# Patient Record
Sex: FEMALE | Race: WHITE | NOT HISPANIC OR LATINO | URBAN - METROPOLITAN AREA
[De-identification: names, ages, dates, MRNs, and addresses within clinical notes are randomized per-mention and may not be internally consistent; named-entity substitution may affect disease eponyms.]

---

## 2021-02-19 ENCOUNTER — INPATIENT (INPATIENT)
Facility: HOSPITAL | Age: 71
LOS: 0 days | Discharge: HOME | End: 2021-02-20
Attending: INTERNAL MEDICINE | Admitting: INTERNAL MEDICINE
Payer: MEDICARE

## 2021-02-19 VITALS
SYSTOLIC BLOOD PRESSURE: 171 MMHG | HEART RATE: 118 BPM | DIASTOLIC BLOOD PRESSURE: 97 MMHG | TEMPERATURE: 98 F | RESPIRATION RATE: 18 BRPM | OXYGEN SATURATION: 97 %

## 2021-02-19 DIAGNOSIS — I26.99 OTHER PULMONARY EMBOLISM WITHOUT ACUTE COR PULMONALE: ICD-10-CM

## 2021-02-19 LAB
ALBUMIN SERPL ELPH-MCNC: 4 G/DL — SIGNIFICANT CHANGE UP (ref 3.5–5.2)
ALP SERPL-CCNC: 89 U/L — SIGNIFICANT CHANGE UP (ref 30–115)
ALT FLD-CCNC: 21 U/L — SIGNIFICANT CHANGE UP (ref 0–41)
ANION GAP SERPL CALC-SCNC: 13 MMOL/L — SIGNIFICANT CHANGE UP (ref 7–14)
APTT BLD: 28.2 SEC — SIGNIFICANT CHANGE UP (ref 27–39.2)
AST SERPL-CCNC: 19 U/L — SIGNIFICANT CHANGE UP (ref 0–41)
BASOPHILS # BLD AUTO: 0.04 K/UL — SIGNIFICANT CHANGE UP (ref 0–0.2)
BASOPHILS NFR BLD AUTO: 0.5 % — SIGNIFICANT CHANGE UP (ref 0–1)
BILIRUB SERPL-MCNC: 0.5 MG/DL — SIGNIFICANT CHANGE UP (ref 0.2–1.2)
BUN SERPL-MCNC: 14 MG/DL — SIGNIFICANT CHANGE UP (ref 10–20)
CALCIUM SERPL-MCNC: 9 MG/DL — SIGNIFICANT CHANGE UP (ref 8.5–10.1)
CHLORIDE SERPL-SCNC: 103 MMOL/L — SIGNIFICANT CHANGE UP (ref 98–110)
CO2 SERPL-SCNC: 26 MMOL/L — SIGNIFICANT CHANGE UP (ref 17–32)
CREAT SERPL-MCNC: 0.7 MG/DL — SIGNIFICANT CHANGE UP (ref 0.7–1.5)
D DIMER BLD IA.RAPID-MCNC: HIGH NG/ML DDU (ref 0–230)
EOSINOPHIL # BLD AUTO: 0.09 K/UL — SIGNIFICANT CHANGE UP (ref 0–0.7)
EOSINOPHIL NFR BLD AUTO: 1.2 % — SIGNIFICANT CHANGE UP (ref 0–8)
GLUCOSE SERPL-MCNC: 169 MG/DL — HIGH (ref 70–99)
HCT VFR BLD CALC: 42.4 % — SIGNIFICANT CHANGE UP (ref 37–47)
HGB BLD-MCNC: 14.4 G/DL — SIGNIFICANT CHANGE UP (ref 12–16)
IMM GRANULOCYTES NFR BLD AUTO: 0.7 % — HIGH (ref 0.1–0.3)
INR BLD: 1.02 RATIO — SIGNIFICANT CHANGE UP (ref 0.65–1.3)
LYMPHOCYTES # BLD AUTO: 2.11 K/UL — SIGNIFICANT CHANGE UP (ref 1.2–3.4)
LYMPHOCYTES # BLD AUTO: 28.5 % — SIGNIFICANT CHANGE UP (ref 20.5–51.1)
MCHC RBC-ENTMCNC: 28.3 PG — SIGNIFICANT CHANGE UP (ref 27–31)
MCHC RBC-ENTMCNC: 34 G/DL — SIGNIFICANT CHANGE UP (ref 32–37)
MCV RBC AUTO: 83.3 FL — SIGNIFICANT CHANGE UP (ref 81–99)
MONOCYTES # BLD AUTO: 0.58 K/UL — SIGNIFICANT CHANGE UP (ref 0.1–0.6)
MONOCYTES NFR BLD AUTO: 7.8 % — SIGNIFICANT CHANGE UP (ref 1.7–9.3)
NEUTROPHILS # BLD AUTO: 4.54 K/UL — SIGNIFICANT CHANGE UP (ref 1.4–6.5)
NEUTROPHILS NFR BLD AUTO: 61.3 % — SIGNIFICANT CHANGE UP (ref 42.2–75.2)
NRBC # BLD: 0 /100 WBCS — SIGNIFICANT CHANGE UP (ref 0–0)
NT-PROBNP SERPL-SCNC: 153 PG/ML — SIGNIFICANT CHANGE UP (ref 0–300)
PLATELET # BLD AUTO: 309 K/UL — SIGNIFICANT CHANGE UP (ref 130–400)
POTASSIUM SERPL-MCNC: 3.7 MMOL/L — SIGNIFICANT CHANGE UP (ref 3.5–5)
POTASSIUM SERPL-SCNC: 3.7 MMOL/L — SIGNIFICANT CHANGE UP (ref 3.5–5)
PROT SERPL-MCNC: 7.4 G/DL — SIGNIFICANT CHANGE UP (ref 6–8)
PROTHROM AB SERPL-ACNC: 11.7 SEC — SIGNIFICANT CHANGE UP (ref 9.95–12.87)
RAPID RVP RESULT: SIGNIFICANT CHANGE UP
RBC # BLD: 5.09 M/UL — SIGNIFICANT CHANGE UP (ref 4.2–5.4)
RBC # FLD: 13.2 % — SIGNIFICANT CHANGE UP (ref 11.5–14.5)
SARS-COV-2 RNA SPEC QL NAA+PROBE: SIGNIFICANT CHANGE UP
SODIUM SERPL-SCNC: 142 MMOL/L — SIGNIFICANT CHANGE UP (ref 135–146)
TROPONIN T SERPL-MCNC: <0.01 NG/ML — SIGNIFICANT CHANGE UP
WBC # BLD: 7.41 K/UL — SIGNIFICANT CHANGE UP (ref 4.8–10.8)
WBC # FLD AUTO: 7.41 K/UL — SIGNIFICANT CHANGE UP (ref 4.8–10.8)

## 2021-02-19 PROCEDURE — 93010 ELECTROCARDIOGRAM REPORT: CPT

## 2021-02-19 PROCEDURE — 99285 EMERGENCY DEPT VISIT HI MDM: CPT

## 2021-02-19 PROCEDURE — 71275 CT ANGIOGRAPHY CHEST: CPT | Mod: 26

## 2021-02-19 PROCEDURE — 93970 EXTREMITY STUDY: CPT | Mod: 26

## 2021-02-19 RX ORDER — ACETAMINOPHEN 500 MG
650 TABLET ORAL EVERY 4 HOURS
Refills: 0 | Status: DISCONTINUED | OUTPATIENT
Start: 2021-02-19 | End: 2021-02-20

## 2021-02-19 RX ORDER — IBUPROFEN 200 MG
600 TABLET ORAL EVERY 6 HOURS
Refills: 0 | Status: DISCONTINUED | OUTPATIENT
Start: 2021-02-19 | End: 2021-02-20

## 2021-02-19 RX ORDER — ONDANSETRON 8 MG/1
4 TABLET, FILM COATED ORAL EVERY 6 HOURS
Refills: 0 | Status: DISCONTINUED | OUTPATIENT
Start: 2021-02-19 | End: 2021-02-20

## 2021-02-19 RX ORDER — PANTOPRAZOLE SODIUM 20 MG/1
40 TABLET, DELAYED RELEASE ORAL
Refills: 0 | Status: DISCONTINUED | OUTPATIENT
Start: 2021-02-19 | End: 2021-02-20

## 2021-02-19 RX ORDER — ACETAMINOPHEN 500 MG
650 TABLET ORAL ONCE
Refills: 0 | Status: COMPLETED | OUTPATIENT
Start: 2021-02-19 | End: 2021-02-19

## 2021-02-19 RX ORDER — ENOXAPARIN SODIUM 100 MG/ML
70 INJECTION SUBCUTANEOUS
Refills: 0 | Status: DISCONTINUED | OUTPATIENT
Start: 2021-02-19 | End: 2021-02-20

## 2021-02-19 RX ORDER — ENOXAPARIN SODIUM 100 MG/ML
60 INJECTION SUBCUTANEOUS ONCE
Refills: 0 | Status: COMPLETED | OUTPATIENT
Start: 2021-02-19 | End: 2021-02-19

## 2021-02-19 RX ORDER — MORPHINE SULFATE 50 MG/1
2 CAPSULE, EXTENDED RELEASE ORAL EVERY 4 HOURS
Refills: 0 | Status: DISCONTINUED | OUTPATIENT
Start: 2021-02-19 | End: 2021-02-20

## 2021-02-19 RX ADMIN — Medication 650 MILLIGRAM(S): at 11:38

## 2021-02-19 RX ADMIN — ENOXAPARIN SODIUM 60 MILLIGRAM(S): 100 INJECTION SUBCUTANEOUS at 15:55

## 2021-02-19 RX ADMIN — Medication 650 MILLIGRAM(S): at 21:45

## 2021-02-19 NOTE — ED PROVIDER NOTE - PHYSICAL EXAMINATION
PHYSICAL EXAM: I have reviewed current vital signs.  GENERAL: NAD, well-nourished; well-developed.  HEAD:  Normocephalic, atraumatic.  EYES: Conjunctiva and sclera clear.  ENT: MMM.  NECK: Supple, FROM.  CHEST/LUNG: Clear to auscultation bilaterally; no wheezes, rales, or rhonchi.  HEART: Regular rate and rhythm, normal S1 and S2; no murmurs, rubs, or gallops.  ABDOMEN: Soft, nontender, nondistended.  EXTREMITIES:  2+ peripheral pulses; BLE- mild calf TTP, no overlying skin wounds/erythema.  NEUROLOGY: A&O x 3. Motor 5/5. No focal neurological deficits.   SKIN: Warm and dry.

## 2021-02-19 NOTE — H&P ADULT - NSICDXPASTMEDICALHX_GEN_ALL_CORE_FT
PAST MEDICAL HISTORY:  Infection due to severe acute respiratory syndrome coronavirus 2 (SARS-CoV-2) in 01/2021

## 2021-02-19 NOTE — H&P ADULT - NSICDXFAMILYHX_GEN_ALL_CORE_FT
FAMILY HISTORY:  Family history non-contributory, No FHx of thrombophilias, bleeding disorders, malignancies

## 2021-02-19 NOTE — H&P ADULT - HISTORY OF PRESENT ILLNESS
70 year old female patient, known previously healthy, presenting for bilateral calf pain and dyspnea.     Current history goes back to 5 weeks prior to presentation when the patient began experiencing fever and cough with mild dyspnea on exertion.   Patient was diagnosed with COVID-19 LRTI and self quarantined at home.   Patient never required supplemental oxygen.   One week prior to presentation, patient began experiencing bilateral calf pain. Pain persisted throughout the week for which she seeked medical attention. No significant change in dyspnea on exertion, no chest pain, no fever, no chills, no diaphoresis.

## 2021-02-19 NOTE — H&P ADULT - ATTENDING COMMENTS
I saw and evaluated the patient. I have reviewed and agree with the findings and plan of care as documented above in the resident’s note (unless indicated differently below). Any necessary changes were made in the body of the text.    CC: b/l calf pain and dyspnea    HPI:  71 yo F pt who had SARS-CoV-2 infection (~ 5 weeks ago) p/w b/l calf pain and mild dyspnea on exertion. Onset: 1 week ago. Course: persistent symptoms. Intensity: mild to moderate. Pain: along b/l calves (more prominent on rt), aching, mild to mod, non-radiating. Precipitating factors: infection; denies prolonged immobility or travel. Alleviating factors: none. Aggravating factors: exertion. States that nothing like this has happened to her before.    ROS:  Constitutional: no fevers; no chills  Eyes: no conjunctivitis; no itching  ENT: no dysphagia; no odynophagia  CVS: no PND; no orthopnea; no chest pain  Resp: +mild STREET (constant without worsening); no coughing  GI: no nausea; no vomiting; no diarrhea; no abd pain  : no dysuria; no hematuria  MSK: +b/l LE calf area pain  Skin: no rashes; no ulcers  Neuro: no focal weakness; no headache  All other systems reviewed and are negative    PMHx, home medications, SurHx, FHx and Social history as above in the corresponding sections of the note - reviewed and edited where appropriate    Exam:  Vitals: BP = 144/78; P = 79; T = 98.3; RR = 18; SpO2 92-95 on room air  General: appears stated age; cooperative  Eyes: anicteric sclera; moist conjunctiva; PERRL; EOMI  HENT: NC/AT; clear oropharynx w/ moist mucous membranes; nL hard/soft palate  Neck: supple w/ FROM; trachea midline  Lungs: no tachypnea, accessory muscle usage, wheezing, rhonci; +rales mild  CVS: RRR; S1 and S2 w/o MRGs  Abd: BS+; soft; non-tender to palpation x 4; no masses or HSM  Ext: non-edematous; pulses palpable distally; +b/l LE calf tenderness  Skin: normal temp, turgor and texture; no rashes, ulcers or nodules  Neuro: CN II-XII intact; able to move all extremities  Psych: appropriate affect; alert and oriented to person, place, time and situation    Labs reviewed and are significant for , trig 193    Imaging reviewed:  --- Extensive b/l pulmonary arterial emboli throughout all b/l lobar branches and peripherally  --- Partial extension of thrombus from distal right main pulmonary artery  --- No rt heart strain  --- Diffuse b/l lung groundglass opacities  --- Acute DVT - rt popliteal, rt posterior tibial and rt peroneal  --- Acute DVT - left posterior tibial and left peroneal    EKG reviewed: NSR    Assessment:  (1) Hemodynamically stable, acute lobar pulm emboli w/ mild dyspnea  (2) Acute DVT of the rt popliteal, rt/lt posterior tibial, rt/lt peroneal  (3) Recent hx of SARS-CoV-2 PNA  (4) Hyperglycemia possibly stress assoc  (5) Hypertriglyceridemia on blood work    Plan:  Given extensive clot burden, requested a Vascular evaluation (? would the patient benefit from a mechanical thrombectomy or catheter directed intervention). Otherwise, can switch the patient to PO apixaban and can d/c later today with close out-pt follow up. I favor indefinite anticoagulation for this patient as I question the degree to which this thromboembolic event was provoked (patient reports being relatively active even during her home quarantine). The patient should definitely undergo age appropriate out-pt malignancy screening. Medications reviewed and adjusted as appropriate. Supportive care PRN.    Full code

## 2021-02-19 NOTE — ED ADULT TRIAGE NOTE - CHIEF COMPLAINT QUOTE
Pt c/o b/l LE swelling and pain x 1 week. Pt did not get tested for covid but states had symptoms and +contacts. Pt c/o b/l LE swelling and pain x 1 week. Pt did not get tested for covid but states had symptoms and +contacts.  Son - Cristhian # [phone]

## 2021-02-19 NOTE — ED PROVIDER NOTE - CLINICAL SUMMARY MEDICAL DECISION MAKING FREE TEXT BOX
69 y/o F p/w b/l leg pain x 1wk. Reports COVID sx 3 wks ago. No cp, sob, cough. 69 y/o F pmh as noted, p/w b/l atraumatic intermittent no constant leg pain. No weakness or numbness. no cp sob or hx VTE. had COVID URI sx and sick contacts 3wks ago- sx resolved.    Pt is NAD; CTAB; legs nl inspection; + ttp b/l calf; NL pulses motor and sensory    + elevated ddimer, + multiple DVT and PE's on US and CTA chest  No sign R heart strain. Pt remained hemodynamically stable, no acute events or new sx.    results discussed w/ pt and son. Lovenox given.  Pt admitted to medicine for further management.

## 2021-02-19 NOTE — H&P ADULT - ASSESSMENT
70 year old female patient presenting for bilateral DVT and subsequent PE.     # DVT / PE  - DVT:    > right popliteal vein, right posterior tibial vein, and right peroneal vein.    > left posterior tibial vein and left peroneal vein.  - PE:    > Extensive bilateral pulmonary arterial emboli throughout all bilateral lobar branches and peripherally, with partial extension of thrombus from distal right main pulmonary artery.     > No evidence of right heart strain.  - Started on Lovenox 1mg/kg bid, to be transitioned to apixaban    # COVID-19 LRTI  - Diagnosed 5 weeks ago  - Saturation 98% on room air  - CT scan showed Diffuse bilateral lung groundglass opacification, which may be due to incomplete inspiration however correlate for potential superimposed viral pneumonia in the appropriate clinical setting.  - No required intervention for now    # Diet > Regular

## 2021-02-19 NOTE — ED PROVIDER NOTE - PROGRESS NOTE DETAILS
Mihaela- Spoke at length with patient. Patient initially refused covid swab. Informed patient that she has bilateral DVTs and we need to further assess for PEs. She will need AC vs. thrombectomy and AC after. Patient understands severity of condition. She verbalized she does not wish to be in the hospital but understands she will need to be admitted for further monitoring and anticoagulation. Mihaela- Updated patient's son, Vernon, on pt's clinical course and need to stay. VS stable.

## 2021-02-19 NOTE — ED PROVIDER NOTE - ATTENDING CONTRIBUTION TO CARE
69 y/o F pmh as noted, p/w b/l atraumatic intermittent no constant leg pain. No weakness or numbness. no cp sob or hx VTE. had COVID URI sx and sick contacts 3wks ago- sx resolved.    Pt is NAD; CTAB; legs nl inspection; + ttp b/l calf; NL pulses motor and sensory    + elevated ddimer, + multiple DVT and PE's on US and CTA chest  No sign R heart strain. Pt remained hemodynamically stable, no acute events or new sx.    results discussed w/ pt and son. Lovenox given.  Pt admitted to medicine for further management.

## 2021-02-19 NOTE — H&P ADULT - NSHPPHYSICALEXAM_GEN_ALL_CORE
General: A/ox 3, No acute Distress  Neck: Supple, NO JVD  Cardiac: S1 S2 heard regular, No audible murmurs  Pulmonary: Good bilateral breath sounds, Breathing unlabored, No Rhonchi/Rales/Wheezing  Abdomen: Soft, Non -tender, +BS   Extremities: Bilateral calf tenderness, hard, mild edema  Neuro: A/o x 3, No focal deficits  Psch: normal mood , normal affect    T(C): 36.7 (02-19-21 @ 16:02), Max: 36.9 (02-19-21 @ 10:59)  HR: 82 (02-19-21 @ 16:02) (82 - 118)  BP: 160/89 (02-19-21 @ 16:02) (160/89 - 171/97)  RR: 17 (02-19-21 @ 16:02) (17 - 18)  SpO2: 97% (02-19-21 @ 16:02) (97% - 97%)

## 2021-02-19 NOTE — ED PROVIDER NOTE - OBJECTIVE STATEMENT
71yo F with PMH of prior appendectomy presenting to ED with bilateral LE pain x 1 week. Patient states she has had multiple contacts with loved ones with covid at home. Had covid like sxs about 2-3 weeks ago which included n/v/d, NBNB, myalgias, and weakness. Sxs now improved. Denies any f/c, CP, SOB, abd pain, tearing back pain, rashes, injuries/traumas/falls/syncope, or hx of DVT/PE. Nonsmoker.

## 2021-02-19 NOTE — H&P ADULT - NSHPLABSRESULTS_GEN_ALL_CORE
LABS:                        14.4   7.41  )-----------( 309      ( 19 Feb 2021 11:20 )             42.4     02-19    142  |  103  |  14  ----------------------------<  169<H>  3.7   |  26  |  0.7    Ca    9.0      19 Feb 2021 11:20    TPro  7.4  /  Alb  4.0  /  TBili  0.5  /  DBili  x   /  AST  19  /  ALT  21  /  AlkPhos  89  02-19    PT/INR - ( 19 Feb 2021 11:20 )   PT: 11.70 sec;   INR: 1.02 ratio         PTT - ( 19 Feb 2021 11:20 )  PTT:28.2 sec  Aspartate Aminotransferase (AST/SGOT): 19 U/L (02-19-21 @ 11:20)  Alanine Aminotransferase (ALT/SGPT): 21 U/L (02-19-21 @ 11:20)    VA Duplex Lower Ext Vein Scan, Bilat (02.19.21 @ 13:28) >  Acute deep vein thrombus of the right popliteal vein, right posterior tibial vein, and right peroneal vein.  Acute deep vein thrombus of the left posterior tibial vein and left peroneal vein.    CT Angio Chest PE Protocol w/ IV Cont (02.19.21 @ 15:28) >    1. Extensive bilateral pulmonary arterial emboli throughout all bilateral lobar branches and peripherally, with partial extension of thrombus from distal right main pulmonary artery. No evidence of right heart strain.    2. Diffuse bilateral lung groundglass opacification, which may be due to incomplete inspiration however correlate for potential superimposed viral pneumonia in the appropriate clinical setting.

## 2021-02-19 NOTE — ED ADULT NURSE NOTE - CHIEF COMPLAINT QUOTE
Pt c/o b/l LE swelling and pain x 1 week. Pt did not get tested for covid but states had symptoms and +contacts.

## 2021-02-19 NOTE — ED PROVIDER NOTE - NS ED ROS FT
Constitutional:  No fevers or chills.  Eyes:  No visual changes.  ENT:  No sore throat.  Neck:  No neck pain or stiffness.  Cardiac:  No CP.  Resp:  No cough or SOB. No hemoptysis.   GI:  No nausea, vomiting, diarrhea, or abdominal pain.  MSK:  +BLE leg pain.  Neuro:  No headache, dizziness, or weakness.  Skin:  No skin rash.

## 2021-02-19 NOTE — ED ADULT NURSE NOTE - OBJECTIVE STATEMENT
Pt c/o b/l leg swelling and pain x 1 week. Pt states she has been quarantining due to covid symptoms and + contacts. Pt states she did not get tested for covid. Pt asymptomatic at this time

## 2021-02-20 ENCOUNTER — TRANSCRIPTION ENCOUNTER (OUTPATIENT)
Age: 71
End: 2021-02-20

## 2021-02-20 VITALS
OXYGEN SATURATION: 95 % | SYSTOLIC BLOOD PRESSURE: 144 MMHG | DIASTOLIC BLOOD PRESSURE: 78 MMHG | RESPIRATION RATE: 18 BRPM | TEMPERATURE: 98 F | HEART RATE: 79 BPM

## 2021-02-20 DIAGNOSIS — Z90.49 ACQUIRED ABSENCE OF OTHER SPECIFIED PARTS OF DIGESTIVE TRACT: Chronic | ICD-10-CM

## 2021-02-20 LAB
A1C WITH ESTIMATED AVERAGE GLUCOSE RESULT: 6.6 % — HIGH (ref 4–5.6)
ALBUMIN SERPL ELPH-MCNC: 3.8 G/DL — SIGNIFICANT CHANGE UP (ref 3.5–5.2)
ALP SERPL-CCNC: 89 U/L — SIGNIFICANT CHANGE UP (ref 30–115)
ALT FLD-CCNC: 19 U/L — SIGNIFICANT CHANGE UP (ref 0–41)
ANION GAP SERPL CALC-SCNC: 10 MMOL/L — SIGNIFICANT CHANGE UP (ref 7–14)
AST SERPL-CCNC: 18 U/L — SIGNIFICANT CHANGE UP (ref 0–41)
BASOPHILS # BLD AUTO: 0.03 K/UL — SIGNIFICANT CHANGE UP (ref 0–0.2)
BASOPHILS NFR BLD AUTO: 0.5 % — SIGNIFICANT CHANGE UP (ref 0–1)
BILIRUB SERPL-MCNC: 0.5 MG/DL — SIGNIFICANT CHANGE UP (ref 0.2–1.2)
BUN SERPL-MCNC: 13 MG/DL — SIGNIFICANT CHANGE UP (ref 10–20)
CALCIUM SERPL-MCNC: 8.9 MG/DL — SIGNIFICANT CHANGE UP (ref 8.5–10.1)
CHLORIDE SERPL-SCNC: 104 MMOL/L — SIGNIFICANT CHANGE UP (ref 98–110)
CHOLEST SERPL-MCNC: 172 MG/DL — SIGNIFICANT CHANGE UP
CO2 SERPL-SCNC: 28 MMOL/L — SIGNIFICANT CHANGE UP (ref 17–32)
CREAT SERPL-MCNC: 0.7 MG/DL — SIGNIFICANT CHANGE UP (ref 0.7–1.5)
EOSINOPHIL # BLD AUTO: 0.16 K/UL — SIGNIFICANT CHANGE UP (ref 0–0.7)
EOSINOPHIL NFR BLD AUTO: 2.6 % — SIGNIFICANT CHANGE UP (ref 0–8)
ESTIMATED AVERAGE GLUCOSE: 143 MG/DL — HIGH (ref 68–114)
GLUCOSE SERPL-MCNC: 114 MG/DL — HIGH (ref 70–99)
HCT VFR BLD CALC: 43.1 % — SIGNIFICANT CHANGE UP (ref 37–47)
HCV AB S/CO SERPL IA: 0.03 COI — SIGNIFICANT CHANGE UP
HCV AB SERPL-IMP: SIGNIFICANT CHANGE UP
HDLC SERPL-MCNC: 40 MG/DL — LOW
HGB BLD-MCNC: 14.4 G/DL — SIGNIFICANT CHANGE UP (ref 12–16)
IMM GRANULOCYTES NFR BLD AUTO: 0.8 % — HIGH (ref 0.1–0.3)
LIPID PNL WITH DIRECT LDL SERPL: 104 MG/DL — HIGH
LYMPHOCYTES # BLD AUTO: 2.3 K/UL — SIGNIFICANT CHANGE UP (ref 1.2–3.4)
LYMPHOCYTES # BLD AUTO: 36.7 % — SIGNIFICANT CHANGE UP (ref 20.5–51.1)
MAGNESIUM SERPL-MCNC: 2.3 MG/DL — SIGNIFICANT CHANGE UP (ref 1.8–2.4)
MCHC RBC-ENTMCNC: 28.1 PG — SIGNIFICANT CHANGE UP (ref 27–31)
MCHC RBC-ENTMCNC: 33.4 G/DL — SIGNIFICANT CHANGE UP (ref 32–37)
MCV RBC AUTO: 84 FL — SIGNIFICANT CHANGE UP (ref 81–99)
MONOCYTES # BLD AUTO: 0.66 K/UL — HIGH (ref 0.1–0.6)
MONOCYTES NFR BLD AUTO: 10.5 % — HIGH (ref 1.7–9.3)
NEUTROPHILS # BLD AUTO: 3.06 K/UL — SIGNIFICANT CHANGE UP (ref 1.4–6.5)
NEUTROPHILS NFR BLD AUTO: 48.9 % — SIGNIFICANT CHANGE UP (ref 42.2–75.2)
NON HDL CHOLESTEROL: 132 MG/DL — HIGH
NRBC # BLD: 0 /100 WBCS — SIGNIFICANT CHANGE UP (ref 0–0)
PLATELET # BLD AUTO: 299 K/UL — SIGNIFICANT CHANGE UP (ref 130–400)
POTASSIUM SERPL-MCNC: 3.5 MMOL/L — SIGNIFICANT CHANGE UP (ref 3.5–5)
POTASSIUM SERPL-SCNC: 3.5 MMOL/L — SIGNIFICANT CHANGE UP (ref 3.5–5)
PROT SERPL-MCNC: 7 G/DL — SIGNIFICANT CHANGE UP (ref 6–8)
RBC # BLD: 5.13 M/UL — SIGNIFICANT CHANGE UP (ref 4.2–5.4)
RBC # FLD: 13.6 % — SIGNIFICANT CHANGE UP (ref 11.5–14.5)
SODIUM SERPL-SCNC: 142 MMOL/L — SIGNIFICANT CHANGE UP (ref 135–146)
TRIGL SERPL-MCNC: 193 MG/DL — HIGH
WBC # BLD: 6.26 K/UL — SIGNIFICANT CHANGE UP (ref 4.8–10.8)
WBC # FLD AUTO: 6.26 K/UL — SIGNIFICANT CHANGE UP (ref 4.8–10.8)

## 2021-02-20 PROCEDURE — 99222 1ST HOSP IP/OBS MODERATE 55: CPT

## 2021-02-20 PROCEDURE — 99223 1ST HOSP IP/OBS HIGH 75: CPT

## 2021-02-20 RX ORDER — APIXABAN 2.5 MG/1
1 TABLET, FILM COATED ORAL
Qty: 60 | Refills: 1
Start: 2021-02-20 | End: 2021-04-20

## 2021-02-20 RX ADMIN — ENOXAPARIN SODIUM 70 MILLIGRAM(S): 100 INJECTION SUBCUTANEOUS at 06:31

## 2021-02-20 RX ADMIN — PANTOPRAZOLE SODIUM 40 MILLIGRAM(S): 20 TABLET, DELAYED RELEASE ORAL at 06:31

## 2021-02-20 NOTE — DISCHARGE NOTE PROVIDER - CARE PROVIDERS DIRECT ADDRESSES
,DirectAddress_Unknown,[email or web address]

## 2021-02-20 NOTE — CONSULT NOTE ADULT - SUBJECTIVE AND OBJECTIVE BOX
DEAN CORREIA [id number]  70y Female    HPI:  70 year old female patient, known previously healthy but recent COVID 5 weeks ago, presenting for B/L calf pain with ambulation and dyspnea on exertion. The the dyspnea began with the diagnosis of COVID, for which she self quarantined for & did not require hospitalization or supplemental O2. One week prior to presentation, she began experiencing B/L calf pain with ambulation prompting the ED visit. No significant change in dyspnea on exertion. Denies: chest pain,  fever, chills,  diaphoresis  Evaluated in ED with US & CTA and was found to have BLLE DVTs & arterial pulmonary emboli with no heart strain     (19 Feb 2021 21:08)      PAST MEDICAL & SURGICAL HISTORY:  Infection due to severe acute respiratory syndrome coronavirus 2 (SARS-CoV-2)  in 01/2021    S/P appendectomy    MEDICATIONS  (STANDING):  enoxaparin Injectable 70 milliGRAM(s) SubCutaneous two times a day  pantoprazole    Tablet 40 milliGRAM(s) Oral before breakfast    MEDICATIONS  (PRN):  acetaminophen   Tablet .. 650 milliGRAM(s) Oral every 4 hours PRN Mild Pain (1 - 3)  ibuprofen  Tablet. 600 milliGRAM(s) Oral every 6 hours PRN Moderate Pain (4 - 6)  morphine  - Injectable 2 milliGRAM(s) IV Push every 4 hours PRN Severe Pain (7 - 10)  ondansetron Injectable 4 milliGRAM(s) IV Push every 6 hours PRN Nausea    Allergies  penicillin (Unknown)  Intolerances    REVIEW OF SYSTEMS    [x] A ten-point review of systems was otherwise negative except as noted.  [ ] Due to altered mental status/intubation, subjective information were not able to be obtained from the patient. History was obtained, to the extent possible, from review of the chart and collateral sources of information.    Vital Signs Last 24 Hrs  T(C): 36.8 (20 Feb 2021 07:19), Max: 36.9 (19 Feb 2021 10:59)  T(F): 98.3 (20 Feb 2021 07:19), Max: 98.4 (19 Feb 2021 10:59)  HR: 79 (20 Feb 2021 07:19) (79 - 118)  BP: 144/78 (20 Feb 2021 07:19) (144/78 - 171/97)  BP(mean): --  RR: 18 (20 Feb 2021 07:19) (17 - 19)  SpO2: 95% (20 Feb 2021 07:19) (92% - 98%)    PHYSICAL EXAM:  GENERAL: NAD, well-appearing  CHEST/LUNG: symmetric chest wall expansion  HEART: Regular rate and rhythm  ABDOMEN: Soft, Nontender, Nondistended;   EXTREMITIES:  No clubbing, cyanosis, or edema, + homans sign. calf tenderness. pulses +2 B/L    LABS:  Labs:  CAPILLARY BLOOD GLUCOSE                        14.4   6.26  )-----------( 299      ( 20 Feb 2021 06:51 )             43.1       Auto Neutrophil %: 48.9 % (02-20-21 @ 06:51)  Auto Immature Granulocyte %: 0.8 % (02-20-21 @ 06:51)  Auto Neutrophil %: 61.3 % (02-19-21 @ 11:20)  Auto Immature Granulocyte %: 0.7 % (02-19-21 @ 11:20)    02-20    142  |  104  |  13  ----------------------------<  114<H>  3.5   |  28  |  0.7    Calcium, Total Serum: 8.9 mg/dL (02-20-21 @ 06:51)    LFTs:             7.0  | 0.5  | 18       ------------------[89      ( 20 Feb 2021 06:51 )  3.8  | x    | 19          Coags:     11.70  ----< 1.02    ( 19 Feb 2021 11:20 )     28.2      CARDIAC MARKERS ( 19 Feb 2021 11:20 )  x     / <0.01 ng/mL / x     / x     / x        Serum Pro-Brain Natriuretic Peptide: 153 pg/mL (02-19-21 @ 11:20)    RADIOLOGY & ADDITIONAL STUDIES:  < from: CT Angio Chest PE Protocol w/ IV Cont (02.19.21 @ 15:28) >  IMPRESSION:  1. Extensive bilateral pulmonary arterial emboli throughout all bilateral lobar branches and peripherally, with partial extension of thrombus from distal right main pulmonary artery. No evidence of right heart strain.  2. Diffuse bilateral lung groundglass opacification, which may be due to incomplete inspiration however correlate for potential superimposed viral pneumonia in the appropriate clinical setting.    < from: VA Duplex Lower Ext Vein Scan, Bilat (02.19.21 @ 13:28) >  Impression:  Acute deep vein thrombus of the right popliteal vein, right posterior tibial vein, and right peroneal vein.  Acute deep vein thrombus of the left posterior tibial vein and left peroneal vein.

## 2021-02-20 NOTE — CHART NOTE - NSCHARTNOTEFT_GEN_A_CORE
Pt was seen at bedside. Denies chest pain, no shortness of breath.  She still has left calf tenderness, but she is able to walk.  Vascular Sx note appreciated, no intervention, cont AC.  Eliquis Rx sent to [facility]. CM to f/u if it is covered by insurance. Once medications available pt can be discharged home.

## 2021-02-20 NOTE — DISCHARGE NOTE PROVIDER - CARE PROVIDER_API CALL
GILBERT AREVALO  [id number]  [address]  MICKEY HUNT, Phys,    Phone: [phone]  Fax: [phone]  Follow Up Time:     Fuad Valverde)  Surgery; Vascular Surgery  [address] [place] [address]  Phone: [phone]  Fax: [phone]  Follow Up Time:

## 2021-02-20 NOTE — CONSULT NOTE ADULT - ASSESSMENT
Assessment: 70 year old female patient, known previously healthy but recent COVID 5 weeks ago, presenting for B/L calf pain with ambulation and dyspnea on exertion found to have BLLE DVTs & arterial pulmonary emboli with no heart strain. Patient showing no acute symptoms and need no active vascular intervention at this time. Needs anticoagulation for 3-6 months and 2 week follow up    Plan:  anticoagulation with eliquis or xarelto for 3-6 months  follow up with Dr. Valverde in 2 weeks  Ok for discharge from vascular surgery perspective

## 2021-02-20 NOTE — DISCHARGE NOTE PROVIDER - NSDCCPCAREPLAN_GEN_ALL_CORE_FT
PRINCIPAL DISCHARGE DIAGNOSIS  Diagnosis: Pulmonary embolism  Assessment and Plan of Treatment: Take your blood thinner as prescribed. Follow up with your Primary Medical Doctor within one week of discharge. If you have any worsening of breathing, blood upon coughing, chest pain, fever, lightheadedness or any other concerning or new signs or symptoms, return to the Emergency Room for further care.      SECONDARY DISCHARGE DIAGNOSES  Diagnosis: DVT (deep venous thrombosis)  Assessment and Plan of Treatment: Take your blood thinner as prescribed. Stay active. Follow up with your Primary Medical Doctor within one week of discharge. Return to the ED if you develop any worsening of your current symptoms or any new symptoms.     PRINCIPAL DISCHARGE DIAGNOSIS  Diagnosis: Pulmonary embolism  Assessment and Plan of Treatment: Take your blood thinner as prescribed. Follow up with your Primary Medical Doctor within one week of discharge. If you have any worsening of breathing, blood upon coughing, chest pain, fever, lightheadedness or any other concerning or new signs or symptoms, return to the Emergency Room for further care.  You might need age appropriate cancer screening including, but not limited to mammogram, colonoscopy, PAP-smear.      SECONDARY DISCHARGE DIAGNOSES  Diagnosis: DVT (deep venous thrombosis)  Assessment and Plan of Treatment: Take your blood thinner as prescribed. Stay active. Follow up with your Primary Medical Doctor within one week of discharge. Return to the ED if you develop any worsening of your current symptoms or any new symptoms.

## 2021-02-20 NOTE — CONSULT NOTE ADULT - ATTENDING COMMENTS
70 year old with DVT and PE    start AC  if stable can be discharged with follow up in 2 weeks as outpatient

## 2021-02-20 NOTE — DISCHARGE NOTE PROVIDER - HOSPITAL COURSE
71 yo F pt w/ no relevant PMHx except for a SARS-CoV-2 infection (~ 5 weeks ago) presented with b/l calf pain and mild dyspnea on exertion that began approximately one week ago. On evaluation, she was found to have extensive thromboembolic disease w/ acute lobar pulmonary emboli and bilateral acute deep venous thromboemboli. The patient was promptly started on oral anticoagulation therapy and remained hemodynamically stable and afebrile during her hospitalization. Will need close out-pt follow up with her Primary Care Doctor after discharge. Return precautions were discussed with her at length and these include, but are not limited to, worsening dyspnea, hemoptysis, lightheadedness, chest pain and fever. 71 yo F pt w/ no relevant PMHx except for a SARS-CoV-2 infection (~ 5 weeks ago) presented with b/l calf pain and mild dyspnea on exertion that began approximately one week ago. On evaluation, she was found to have extensive thromboembolic disease w/ acute lobar pulmonary emboli and bilateral acute deep venous thromboemboli. The patient was promptly started on oral anticoagulation therapy and remained hemodynamically stable and afebrile during her hospitalization. Will need close out-pt follow up with her Primary Care Doctor after discharge. Return precautions were discussed with her at length and these include, but are not limited to, worsening dyspnea, hemoptysis, lightheadedness, chest pain and fever.     Discharge instructions:  Take apixaban 10 mg twice a day for one week and then 5 mg twice a day thereafter.  Start taking apixaban today in the evening.   The medication will be sent to [facility] the [facility]. Please  the medication before you leave (pharmacy open until 4 PM).  Follow up with your Primary Care Doctor within one week of discharge.  Follow up with Vascular Surgeon - Dr. Valverde in 2 weeks - call to make an appointment.

## 2021-02-20 NOTE — PROVIDER CONTACT NOTE (OTHER) - ASSESSMENT
patient a.ox3 at bedside vitals stable. discharge documentation incomplete by MD. waiting for d/c orders and instructions by md. multiple attempts to call md on call no answer at this time will continue to attempt.

## 2021-02-20 NOTE — DISCHARGE NOTE PROVIDER - NSDCMRMEDTOKEN_GEN_ALL_CORE_FT
Eliquis Starter Pack for Treatment of DVT and PE 5 mg oral tablet: 1 tab(s) orally 2 times a day MDD:10 mg twice a day for one week and then 5 mg twice a day thereafter

## 2021-02-20 NOTE — DISCHARGE NOTE NURSING/CASE MANAGEMENT/SOCIAL WORK - PATIENT PORTAL LINK FT
You can access the FollowMyHealth Patient Portal offered by [facility] by registering at the following website: [email or web address]. By joining [facility]’s FollowMyHealth portal, you will also be able to view your health information using other applications (apps) compatible with our system.

## 2021-02-22 PROBLEM — Z00.00 ENCOUNTER FOR PREVENTIVE HEALTH EXAMINATION: Status: ACTIVE | Noted: 2021-02-22

## 2021-02-22 PROBLEM — U07.1 COVID-19: Chronic | Status: ACTIVE | Noted: 2021-02-20

## 2021-03-01 DIAGNOSIS — E78.5 HYPERLIPIDEMIA, UNSPECIFIED: ICD-10-CM

## 2021-03-01 DIAGNOSIS — Z98.890 OTHER SPECIFIED POSTPROCEDURAL STATES: ICD-10-CM

## 2021-03-01 DIAGNOSIS — Z88.0 ALLERGY STATUS TO PENICILLIN: ICD-10-CM

## 2021-03-01 DIAGNOSIS — Z86.16 PERSONAL HISTORY OF COVID-19: ICD-10-CM

## 2021-03-01 DIAGNOSIS — I82.443 ACUTE EMBOLISM AND THROMBOSIS OF TIBIAL VEIN, BILATERAL: ICD-10-CM

## 2021-03-01 DIAGNOSIS — R06.00 DYSPNEA, UNSPECIFIED: ICD-10-CM

## 2021-03-01 DIAGNOSIS — I26.99 OTHER PULMONARY EMBOLISM WITHOUT ACUTE COR PULMONALE: ICD-10-CM

## 2021-03-01 DIAGNOSIS — R73.9 HYPERGLYCEMIA, UNSPECIFIED: ICD-10-CM

## 2021-03-01 DIAGNOSIS — I82.431 ACUTE EMBOLISM AND THROMBOSIS OF RIGHT POPLITEAL VEIN: ICD-10-CM

## 2021-03-01 DIAGNOSIS — I82.453 ACUTE EMBOLISM AND THROMBOSIS OF PERONEAL VEIN, BILATERAL: ICD-10-CM

## 2021-03-15 ENCOUNTER — APPOINTMENT (OUTPATIENT)
Dept: VASCULAR SURGERY | Facility: CLINIC | Age: 71
End: 2021-03-15
Payer: MEDICARE

## 2021-03-15 VITALS
WEIGHT: 155 LBS | BODY MASS INDEX: 29.27 KG/M2 | DIASTOLIC BLOOD PRESSURE: 74 MMHG | SYSTOLIC BLOOD PRESSURE: 138 MMHG | TEMPERATURE: 98 F | HEIGHT: 61 IN

## 2021-03-15 DIAGNOSIS — M17.10 UNILATERAL PRIMARY OSTEOARTHRITIS, UNSPECIFIED KNEE: ICD-10-CM

## 2021-03-15 DIAGNOSIS — I26.99 OTHER PULMONARY EMBOLISM W/OUT ACUTE COR PULMONALE: ICD-10-CM

## 2021-03-15 PROCEDURE — 99213 OFFICE O/P EST LOW 20 MIN: CPT

## 2021-03-15 PROCEDURE — 99072 ADDL SUPL MATRL&STAF TM PHE: CPT

## 2021-03-15 PROCEDURE — 93970 EXTREMITY STUDY: CPT

## 2021-03-15 RX ORDER — APIXABAN 5 MG/1
TABLET, FILM COATED ORAL
Refills: 0 | Status: ACTIVE | COMMUNITY

## 2021-03-15 NOTE — HISTORY OF PRESENT ILLNESS
[FreeTextEntry1] : 71 y/o female with h/o arthritis was seen in ED in February for bilateral calf pain, was found ot have DVT and PE and is now on Eliquis. She states that she was sick with COVID about 5 weeks prior. Leg pain is improved, no leg swelling, denies any prior h/o DVT.

## 2021-03-15 NOTE — ASSESSMENT
[FreeTextEntry1] : 69 y/o female with LE DVT and pulmonary embolism after being sick with COVID.\par \par There was no evidence of DVT in the lower extremities bilaterally.\par \par She will require 3-6 months of anticoagulation and has follow up with Pulmonary. I will see her back in 3 months time for repeat venous duplex of the lower extremities.

## 2021-03-15 NOTE — CONSULT LETTER
[Dear  ___] : Dear  [unfilled], [Consult Letter:] : I had the pleasure of evaluating your patient, [unfilled]. [Please see my note below.] : Please see my note below. [FreeTextEntry2] : Dear Dr. Abdon Kessler,

## 2021-06-01 RX ORDER — APIXABAN 5 MG/1
5 TABLET, FILM COATED ORAL TWICE DAILY
Qty: 180 | Refills: 1 | Status: ACTIVE | COMMUNITY
Start: 2021-03-15 | End: 1900-01-01

## 2021-06-17 ENCOUNTER — APPOINTMENT (OUTPATIENT)
Dept: VASCULAR SURGERY | Facility: CLINIC | Age: 71
End: 2021-06-17
Payer: MEDICARE

## 2021-06-17 VITALS
HEART RATE: 91 BPM | HEIGHT: 61 IN | WEIGHT: 158 LBS | DIASTOLIC BLOOD PRESSURE: 73 MMHG | TEMPERATURE: 97.6 F | BODY MASS INDEX: 29.83 KG/M2 | SYSTOLIC BLOOD PRESSURE: 160 MMHG

## 2021-06-17 PROCEDURE — 99214 OFFICE O/P EST MOD 30 MIN: CPT

## 2021-06-17 PROCEDURE — 93970 EXTREMITY STUDY: CPT

## 2021-06-17 NOTE — ASSESSMENT
[FreeTextEntry1] : 69 y/o female with LE DVT and pulmonary embolism in February 2021 after being sick with COVID.\par \par There was no evidence of DVT in the lower extremities bilaterally.\par \par She will require 6 months of anticoagulation and has follow up with Pulmonary. She will be on anticoagulation for 2 more months. \par \par I will see her back in 6 months time for repeat venous duplex of the lower extremities.

## 2021-06-17 NOTE — HISTORY OF PRESENT ILLNESS
[FreeTextEntry1] : 70 y/o female with h/o arthritis was seen in ED in February 2021 for bilateral calf pain, was found to have DVT and PE and is now on Eliquis. She states that she was sick with COVID about 5 weeks prior. Leg pain is improved, no leg swelling, denies any prior h/o DVT.

## 2021-06-19 ENCOUNTER — OUTPATIENT (OUTPATIENT)
Dept: OUTPATIENT SERVICES | Facility: HOSPITAL | Age: 71
LOS: 1 days | Discharge: HOME | End: 2021-06-19
Payer: MEDICAID

## 2021-06-19 DIAGNOSIS — Z86.16 PERSONAL HISTORY OF COVID-19: ICD-10-CM

## 2021-06-19 DIAGNOSIS — Z90.49 ACQUIRED ABSENCE OF OTHER SPECIFIED PARTS OF DIGESTIVE TRACT: Chronic | ICD-10-CM

## 2021-06-19 PROCEDURE — 71250 CT THORAX DX C-: CPT | Mod: 26

## 2021-12-30 ENCOUNTER — APPOINTMENT (OUTPATIENT)
Dept: VASCULAR SURGERY | Facility: CLINIC | Age: 71
End: 2021-12-30

## 2022-02-10 ENCOUNTER — APPOINTMENT (OUTPATIENT)
Dept: VASCULAR SURGERY | Facility: CLINIC | Age: 72
End: 2022-02-10
Payer: MEDICARE

## 2022-02-10 PROCEDURE — 99214 OFFICE O/P EST MOD 30 MIN: CPT

## 2022-02-10 PROCEDURE — 93970 EXTREMITY STUDY: CPT

## 2022-02-10 NOTE — CONSULT LETTER
[Dear  ___] : Dear  [unfilled], [Courtesy Letter:] : I had the pleasure of seeing your patient, [unfilled], in my office today. [Please see my note below.] : Please see my note below. [FreeTextEntry2] : Dear Dr. Venita Allen,

## 2022-02-10 NOTE — ASSESSMENT
[FreeTextEntry1] : 70 y/o female with LE DVT and pulmonary embolism in February 2021 after being sick with COVID. She completed six months of anticoagulation and discontinued Eliquis in August 2021. She c/o knee pain and may need knee surgery.\par \par No evidence of DVT in the lower extremities bilaterally.\par \par She can follow up as needed.

## 2022-02-10 NOTE — HISTORY OF PRESENT ILLNESS
[FreeTextEntry1] : 72 y/o female with LE DVT and pulmonary embolism in February 2021 after being sick with COVID. She completed six months of anticoagulation and discontinued Eliquis in August 2021.\par \par  \par

## 2023-02-23 ENCOUNTER — APPOINTMENT (OUTPATIENT)
Dept: VASCULAR SURGERY | Facility: CLINIC | Age: 73
End: 2023-02-23
Payer: MEDICARE

## 2023-02-23 VITALS — WEIGHT: 170 LBS | HEIGHT: 61 IN | BODY MASS INDEX: 32.1 KG/M2

## 2023-02-23 DIAGNOSIS — M79.605 PAIN IN RIGHT LEG: ICD-10-CM

## 2023-02-23 DIAGNOSIS — I82.409 ACUTE EMBOLISM AND THROMBOSIS OF UNSPECIFIED DEEP VEINS OF UNSPECIFIED LOWER EXTREMITY: ICD-10-CM

## 2023-02-23 DIAGNOSIS — M79.604 PAIN IN RIGHT LEG: ICD-10-CM

## 2023-02-23 PROCEDURE — 93970 EXTREMITY STUDY: CPT

## 2023-02-23 PROCEDURE — 99212 OFFICE O/P EST SF 10 MIN: CPT

## 2023-02-23 NOTE — ASSESSMENT
[FreeTextEntry1] : 71 y/o female with LE DVT and pulmonary embolism in February 2021 after being sick with COVID. She completed six months of anticoagulation and discontinued Eliquis in August 2021. \par \par No evidence of DVT in the lower extremities bilaterally on duplex today.\par \par She can follow up as needed.

## 2023-02-23 NOTE — HISTORY OF PRESENT ILLNESS
[FreeTextEntry1] : 73 y/o female with LE DVT and pulmonary embolism in February 2021 after being sick with COVID. She completed six months of anticoagulation and discontinued Eliquis in August 2021.

## 2023-10-31 NOTE — DISCHARGE NOTE PROVIDER - NSDCQMSTROKE_NEU_ALL_CORE
Patient saw Dr. Welsh last on 10/19/23 for follow up and cpe scheduled 4/24/24.    Hmg CoA Reductase Inhibitors (Statin) Refill Protocol - 12 Month Protocol Failed 10/30/2023 06:07 AM   Protocol Details  Lipid Panel or Direct LDL resulted within last 15 months    Patient is NOT on Gemfibrozil          Dr. Welsh, please sign refills if okay and check flp April at cpe or advise if you want flp now and any other additional labs?  
Rx signed, can check lipids in April  
No

## 2025-03-25 ENCOUNTER — INPATIENT (INPATIENT)
Facility: HOSPITAL | Age: 75
LOS: 4 days | Discharge: HOME | DRG: 417 | End: 2025-03-30
Attending: SURGERY | Admitting: SURGERY
Payer: MEDICARE

## 2025-03-25 VITALS
SYSTOLIC BLOOD PRESSURE: 168 MMHG | RESPIRATION RATE: 18 BRPM | DIASTOLIC BLOOD PRESSURE: 89 MMHG | TEMPERATURE: 98 F | OXYGEN SATURATION: 98 % | HEART RATE: 68 BPM | WEIGHT: 145.06 LBS

## 2025-03-25 DIAGNOSIS — Z90.49 ACQUIRED ABSENCE OF OTHER SPECIFIED PARTS OF DIGESTIVE TRACT: Chronic | ICD-10-CM

## 2025-03-25 PROCEDURE — 99285 EMERGENCY DEPT VISIT HI MDM: CPT

## 2025-03-25 RX ORDER — ONDANSETRON HCL/PF 4 MG/2 ML
4 VIAL (ML) INJECTION ONCE
Refills: 0 | Status: COMPLETED | OUTPATIENT
Start: 2025-03-25 | End: 2025-03-25

## 2025-03-25 NOTE — ED ADULT TRIAGE NOTE - BP NONINVASIVE DIASTOLIC (MM HG)
Pt called needs refill for the metoPROLOL succinate (TOPROL-XL) 25 MG 24 hr tablet sent to the [facility] on file thank you   89

## 2025-03-26 DIAGNOSIS — K85.10 BILIARY ACUTE PANCREATITIS WITHOUT NECROSIS OR INFECTION: ICD-10-CM

## 2025-03-26 LAB
ALBUMIN SERPL ELPH-MCNC: 3.3 G/DL — LOW (ref 3.5–5.2)
ALBUMIN SERPL ELPH-MCNC: 3.8 G/DL — SIGNIFICANT CHANGE UP (ref 3.5–5.2)
ALBUMIN SERPL ELPH-MCNC: 4.3 G/DL — SIGNIFICANT CHANGE UP (ref 3.5–5.2)
ALP SERPL-CCNC: 132 U/L — HIGH (ref 30–115)
ALP SERPL-CCNC: 146 U/L — HIGH (ref 30–115)
ALP SERPL-CCNC: 158 U/L — HIGH (ref 30–115)
ALT FLD-CCNC: 211 U/L — HIGH (ref 0–41)
ALT FLD-CCNC: 500 U/L — HIGH (ref 0–41)
ALT FLD-CCNC: 649 U/L — HIGH (ref 0–41)
ANION GAP SERPL CALC-SCNC: 10 MMOL/L — SIGNIFICANT CHANGE UP (ref 7–14)
ANION GAP SERPL CALC-SCNC: 11 MMOL/L — SIGNIFICANT CHANGE UP (ref 7–14)
ANION GAP SERPL CALC-SCNC: 15 MMOL/L — HIGH (ref 7–14)
APTT BLD: 24.1 SEC — LOW (ref 27–39.2)
AST SERPL-CCNC: 401 U/L — HIGH (ref 0–41)
AST SERPL-CCNC: 429 U/L — HIGH (ref 0–41)
AST SERPL-CCNC: 838 U/L — HIGH (ref 0–41)
BASOPHILS # BLD AUTO: 0.02 K/UL — SIGNIFICANT CHANGE UP (ref 0–0.2)
BASOPHILS # BLD AUTO: 0.03 K/UL — SIGNIFICANT CHANGE UP (ref 0–0.2)
BASOPHILS # BLD AUTO: 0.05 K/UL — SIGNIFICANT CHANGE UP (ref 0–0.2)
BASOPHILS NFR BLD AUTO: 0.2 % — SIGNIFICANT CHANGE UP (ref 0–1)
BASOPHILS NFR BLD AUTO: 0.2 % — SIGNIFICANT CHANGE UP (ref 0–1)
BASOPHILS NFR BLD AUTO: 0.4 % — SIGNIFICANT CHANGE UP (ref 0–1)
BILIRUB DIRECT SERPL-MCNC: 0.8 MG/DL — HIGH (ref 0–0.3)
BILIRUB DIRECT SERPL-MCNC: 1.4 MG/DL — HIGH (ref 0–0.3)
BILIRUB DIRECT SERPL-MCNC: 2.1 MG/DL — HIGH (ref 0–0.3)
BILIRUB INDIRECT FLD-MCNC: 0.7 MG/DL — SIGNIFICANT CHANGE UP (ref 0.2–1.2)
BILIRUB INDIRECT FLD-MCNC: 1 MG/DL — SIGNIFICANT CHANGE UP (ref 0.2–1.2)
BILIRUB SERPL-MCNC: 1.2 MG/DL — SIGNIFICANT CHANGE UP (ref 0.2–1.2)
BILIRUB SERPL-MCNC: 2.1 MG/DL — HIGH (ref 0.2–1.2)
BILIRUB SERPL-MCNC: 3.1 MG/DL — HIGH (ref 0.2–1.2)
BLD GP AB SCN SERPL QL: SIGNIFICANT CHANGE UP
BUN SERPL-MCNC: 12 MG/DL — SIGNIFICANT CHANGE UP (ref 10–20)
BUN SERPL-MCNC: 14 MG/DL — SIGNIFICANT CHANGE UP (ref 10–20)
BUN SERPL-MCNC: 17 MG/DL — SIGNIFICANT CHANGE UP (ref 10–20)
CALCIUM SERPL-MCNC: 8.5 MG/DL — SIGNIFICANT CHANGE UP (ref 8.4–10.5)
CALCIUM SERPL-MCNC: 9.1 MG/DL — SIGNIFICANT CHANGE UP (ref 8.4–10.5)
CALCIUM SERPL-MCNC: 9.7 MG/DL — SIGNIFICANT CHANGE UP (ref 8.4–10.5)
CHLORIDE SERPL-SCNC: 100 MMOL/L — SIGNIFICANT CHANGE UP (ref 98–110)
CHLORIDE SERPL-SCNC: 100 MMOL/L — SIGNIFICANT CHANGE UP (ref 98–110)
CHLORIDE SERPL-SCNC: 99 MMOL/L — SIGNIFICANT CHANGE UP (ref 98–110)
CHOLEST SERPL-MCNC: 198 MG/DL — SIGNIFICANT CHANGE UP
CO2 SERPL-SCNC: 28 MMOL/L — SIGNIFICANT CHANGE UP (ref 17–32)
CO2 SERPL-SCNC: 29 MMOL/L — SIGNIFICANT CHANGE UP (ref 17–32)
CO2 SERPL-SCNC: 30 MMOL/L — SIGNIFICANT CHANGE UP (ref 17–32)
CREAT SERPL-MCNC: 1 MG/DL — SIGNIFICANT CHANGE UP (ref 0.7–1.5)
EGFR: 60 ML/MIN/1.73M2 — SIGNIFICANT CHANGE UP
EOSINOPHIL # BLD AUTO: 0.02 K/UL — SIGNIFICANT CHANGE UP (ref 0–0.7)
EOSINOPHIL # BLD AUTO: 0.02 K/UL — SIGNIFICANT CHANGE UP (ref 0–0.7)
EOSINOPHIL # BLD AUTO: 0.17 K/UL — SIGNIFICANT CHANGE UP (ref 0–0.7)
EOSINOPHIL NFR BLD AUTO: 0.2 % — SIGNIFICANT CHANGE UP (ref 0–8)
EOSINOPHIL NFR BLD AUTO: 0.2 % — SIGNIFICANT CHANGE UP (ref 0–8)
EOSINOPHIL NFR BLD AUTO: 1.9 % — SIGNIFICANT CHANGE UP (ref 0–8)
GLUCOSE SERPL-MCNC: 117 MG/DL — HIGH (ref 70–99)
GLUCOSE SERPL-MCNC: 153 MG/DL — HIGH (ref 70–99)
GLUCOSE SERPL-MCNC: 92 MG/DL — SIGNIFICANT CHANGE UP (ref 70–99)
HCT VFR BLD CALC: 36.4 % — LOW (ref 37–47)
HCT VFR BLD CALC: 37.8 % — SIGNIFICANT CHANGE UP (ref 37–47)
HCT VFR BLD CALC: 43.1 % — SIGNIFICANT CHANGE UP (ref 37–47)
HDLC SERPL-MCNC: 74 MG/DL — SIGNIFICANT CHANGE UP
HGB BLD-MCNC: 12.4 G/DL — SIGNIFICANT CHANGE UP (ref 12–16)
HGB BLD-MCNC: 12.9 G/DL — SIGNIFICANT CHANGE UP (ref 12–16)
HGB BLD-MCNC: 14.8 G/DL — SIGNIFICANT CHANGE UP (ref 12–16)
IMM GRANULOCYTES NFR BLD AUTO: 0.5 % — HIGH (ref 0.1–0.3)
IMM GRANULOCYTES NFR BLD AUTO: 0.7 % — HIGH (ref 0.1–0.3)
IMM GRANULOCYTES NFR BLD AUTO: 0.8 % — HIGH (ref 0.1–0.3)
INR BLD: 0.88 RATIO — SIGNIFICANT CHANGE UP (ref 0.65–1.3)
LACTATE SERPL-SCNC: 2 MMOL/L — SIGNIFICANT CHANGE UP (ref 0.7–2)
LACTATE SERPL-SCNC: 2.6 MMOL/L — HIGH (ref 0.7–2)
LDLC SERPL-MCNC: 108 MG/DL — HIGH
LIDOCAIN IGE QN: 1065 U/L — HIGH (ref 7–60)
LIPID PNL WITH DIRECT LDL SERPL: 108 MG/DL — HIGH
LYMPHOCYTES # BLD AUTO: 0.87 K/UL — LOW (ref 1.2–3.4)
LYMPHOCYTES # BLD AUTO: 1.47 K/UL — SIGNIFICANT CHANGE UP (ref 1.2–3.4)
LYMPHOCYTES # BLD AUTO: 1.58 K/UL — SIGNIFICANT CHANGE UP (ref 1.2–3.4)
LYMPHOCYTES # BLD AUTO: 12.2 % — LOW (ref 20.5–51.1)
LYMPHOCYTES # BLD AUTO: 16.3 % — LOW (ref 20.5–51.1)
LYMPHOCYTES # BLD AUTO: 6.9 % — LOW (ref 20.5–51.1)
MAGNESIUM SERPL-MCNC: 1.6 MG/DL — LOW (ref 1.8–2.4)
MAGNESIUM SERPL-MCNC: 1.7 MG/DL — LOW (ref 1.8–2.4)
MCHC RBC-ENTMCNC: 28.9 PG — SIGNIFICANT CHANGE UP (ref 27–31)
MCHC RBC-ENTMCNC: 29 PG — SIGNIFICANT CHANGE UP (ref 27–31)
MCHC RBC-ENTMCNC: 29.2 PG — SIGNIFICANT CHANGE UP (ref 27–31)
MCHC RBC-ENTMCNC: 34.1 G/DL — SIGNIFICANT CHANGE UP (ref 32–37)
MCHC RBC-ENTMCNC: 34.1 G/DL — SIGNIFICANT CHANGE UP (ref 32–37)
MCHC RBC-ENTMCNC: 34.3 G/DL — SIGNIFICANT CHANGE UP (ref 32–37)
MCV RBC AUTO: 84.6 FL — SIGNIFICANT CHANGE UP (ref 81–99)
MCV RBC AUTO: 85 FL — SIGNIFICANT CHANGE UP (ref 81–99)
MCV RBC AUTO: 85.2 FL — SIGNIFICANT CHANGE UP (ref 81–99)
MONOCYTES # BLD AUTO: 0.31 K/UL — SIGNIFICANT CHANGE UP (ref 0.1–0.6)
MONOCYTES # BLD AUTO: 0.46 K/UL — SIGNIFICANT CHANGE UP (ref 0.1–0.6)
MONOCYTES # BLD AUTO: 0.46 K/UL — SIGNIFICANT CHANGE UP (ref 0.1–0.6)
MONOCYTES NFR BLD AUTO: 2.5 % — SIGNIFICANT CHANGE UP (ref 1.7–9.3)
MONOCYTES NFR BLD AUTO: 3.5 % — SIGNIFICANT CHANGE UP (ref 1.7–9.3)
MONOCYTES NFR BLD AUTO: 5.1 % — SIGNIFICANT CHANGE UP (ref 1.7–9.3)
NEUTROPHILS # BLD AUTO: 10.81 K/UL — HIGH (ref 1.4–6.5)
NEUTROPHILS # BLD AUTO: 11.31 K/UL — HIGH (ref 1.4–6.5)
NEUTROPHILS # BLD AUTO: 6.81 K/UL — HIGH (ref 1.4–6.5)
NEUTROPHILS NFR BLD AUTO: 75.7 % — HIGH (ref 42.2–75.2)
NEUTROPHILS NFR BLD AUTO: 83.2 % — HIGH (ref 42.2–75.2)
NEUTROPHILS NFR BLD AUTO: 89.5 % — HIGH (ref 42.2–75.2)
NONHDLC SERPL-MCNC: 124 MG/DL — SIGNIFICANT CHANGE UP
NRBC BLD AUTO-RTO: 0 /100 WBCS — SIGNIFICANT CHANGE UP (ref 0–0)
PHOSPHATE SERPL-MCNC: 3.2 MG/DL — SIGNIFICANT CHANGE UP (ref 2.1–4.9)
PHOSPHATE SERPL-MCNC: 3.4 MG/DL — SIGNIFICANT CHANGE UP (ref 2.1–4.9)
PLATELET # BLD AUTO: 218 K/UL — SIGNIFICANT CHANGE UP (ref 130–400)
PLATELET # BLD AUTO: 250 K/UL — SIGNIFICANT CHANGE UP (ref 130–400)
PLATELET # BLD AUTO: 254 K/UL — SIGNIFICANT CHANGE UP (ref 130–400)
PMV BLD: 10 FL — SIGNIFICANT CHANGE UP (ref 7.4–10.4)
PMV BLD: 10.5 FL — HIGH (ref 7.4–10.4)
PMV BLD: 9.8 FL — SIGNIFICANT CHANGE UP (ref 7.4–10.4)
POTASSIUM SERPL-MCNC: 3.2 MMOL/L — LOW (ref 3.5–5)
POTASSIUM SERPL-MCNC: 3.5 MMOL/L — SIGNIFICANT CHANGE UP (ref 3.5–5)
POTASSIUM SERPL-MCNC: 3.6 MMOL/L — SIGNIFICANT CHANGE UP (ref 3.5–5)
POTASSIUM SERPL-SCNC: 3.2 MMOL/L — LOW (ref 3.5–5)
POTASSIUM SERPL-SCNC: 3.5 MMOL/L — SIGNIFICANT CHANGE UP (ref 3.5–5)
POTASSIUM SERPL-SCNC: 3.6 MMOL/L — SIGNIFICANT CHANGE UP (ref 3.5–5)
PROT SERPL-MCNC: 5.7 G/DL — LOW (ref 6–8)
PROT SERPL-MCNC: 5.8 G/DL — LOW (ref 6–8)
PROT SERPL-MCNC: 6.9 G/DL — SIGNIFICANT CHANGE UP (ref 6–8)
PROTHROM AB SERPL-ACNC: 10.3 SEC — SIGNIFICANT CHANGE UP (ref 9.95–12.87)
RBC # BLD: 4.27 M/UL — SIGNIFICANT CHANGE UP (ref 4.2–5.4)
RBC # BLD: 4.47 M/UL — SIGNIFICANT CHANGE UP (ref 4.2–5.4)
RBC # BLD: 5.07 M/UL — SIGNIFICANT CHANGE UP (ref 4.2–5.4)
RBC # FLD: 13.2 % — SIGNIFICANT CHANGE UP (ref 11.5–14.5)
RBC # FLD: 13.2 % — SIGNIFICANT CHANGE UP (ref 11.5–14.5)
RBC # FLD: 13.4 % — SIGNIFICANT CHANGE UP (ref 11.5–14.5)
SODIUM SERPL-SCNC: 140 MMOL/L — SIGNIFICANT CHANGE UP (ref 135–146)
SODIUM SERPL-SCNC: 140 MMOL/L — SIGNIFICANT CHANGE UP (ref 135–146)
SODIUM SERPL-SCNC: 142 MMOL/L — SIGNIFICANT CHANGE UP (ref 135–146)
TRIGL SERPL-MCNC: 89 MG/DL — SIGNIFICANT CHANGE UP
TROPONIN T, HIGH SENSITIVITY RESULT: <6 NG/L — SIGNIFICANT CHANGE UP (ref 6–13)
WBC # BLD: 12.62 K/UL — HIGH (ref 4.8–10.8)
WBC # BLD: 12.99 K/UL — HIGH (ref 4.8–10.8)
WBC # BLD: 9 K/UL — SIGNIFICANT CHANGE UP (ref 4.8–10.8)
WBC # FLD AUTO: 12.62 K/UL — HIGH (ref 4.8–10.8)
WBC # FLD AUTO: 12.99 K/UL — HIGH (ref 4.8–10.8)
WBC # FLD AUTO: 9 K/UL — SIGNIFICANT CHANGE UP (ref 4.8–10.8)

## 2025-03-26 PROCEDURE — 80048 BASIC METABOLIC PNL TOTAL CA: CPT

## 2025-03-26 PROCEDURE — 85027 COMPLETE CBC AUTOMATED: CPT

## 2025-03-26 PROCEDURE — 99223 1ST HOSP IP/OBS HIGH 75: CPT | Mod: 57

## 2025-03-26 PROCEDURE — 84100 ASSAY OF PHOSPHORUS: CPT

## 2025-03-26 PROCEDURE — 83605 ASSAY OF LACTIC ACID: CPT

## 2025-03-26 PROCEDURE — 71045 X-RAY EXAM CHEST 1 VIEW: CPT | Mod: 26

## 2025-03-26 PROCEDURE — 86900 BLOOD TYPING SEROLOGIC ABO: CPT

## 2025-03-26 PROCEDURE — 85730 THROMBOPLASTIN TIME PARTIAL: CPT

## 2025-03-26 PROCEDURE — 80076 HEPATIC FUNCTION PANEL: CPT

## 2025-03-26 PROCEDURE — 83735 ASSAY OF MAGNESIUM: CPT

## 2025-03-26 PROCEDURE — 86850 RBC ANTIBODY SCREEN: CPT

## 2025-03-26 PROCEDURE — 88112 CYTOPATH CELL ENHANCE TECH: CPT

## 2025-03-26 PROCEDURE — 85025 COMPLETE CBC W/AUTO DIFF WBC: CPT

## 2025-03-26 PROCEDURE — 74018 RADEX ABDOMEN 1 VIEW: CPT

## 2025-03-26 PROCEDURE — 85610 PROTHROMBIN TIME: CPT

## 2025-03-26 PROCEDURE — C1889: CPT

## 2025-03-26 PROCEDURE — C2617: CPT

## 2025-03-26 PROCEDURE — 93010 ELECTROCARDIOGRAM REPORT: CPT

## 2025-03-26 PROCEDURE — 88304 TISSUE EXAM BY PATHOLOGIST: CPT

## 2025-03-26 PROCEDURE — 88302 TISSUE EXAM BY PATHOLOGIST: CPT

## 2025-03-26 PROCEDURE — 36415 COLL VENOUS BLD VENIPUNCTURE: CPT

## 2025-03-26 PROCEDURE — 99223 1ST HOSP IP/OBS HIGH 75: CPT

## 2025-03-26 PROCEDURE — C1769: CPT

## 2025-03-26 PROCEDURE — 76705 ECHO EXAM OF ABDOMEN: CPT | Mod: 26

## 2025-03-26 PROCEDURE — S2900: CPT

## 2025-03-26 PROCEDURE — 74177 CT ABD & PELVIS W/CONTRAST: CPT | Mod: 26

## 2025-03-26 PROCEDURE — 86901 BLOOD TYPING SEROLOGIC RH(D): CPT

## 2025-03-26 RX ORDER — CIPROFLOXACIN HCL 250 MG
TABLET ORAL
Refills: 0 | Status: DISCONTINUED | OUTPATIENT
Start: 2025-03-26 | End: 2025-03-26

## 2025-03-26 RX ORDER — CEFTRIAXONE 500 MG/1
1000 INJECTION, POWDER, FOR SOLUTION INTRAMUSCULAR; INTRAVENOUS EVERY 24 HOURS
Refills: 0 | Status: DISCONTINUED | OUTPATIENT
Start: 2025-03-27 | End: 2025-03-29

## 2025-03-26 RX ORDER — SODIUM CHLORIDE 9 G/1000ML
1000 INJECTION, SOLUTION INTRAVENOUS
Refills: 0 | Status: DISCONTINUED | OUTPATIENT
Start: 2025-03-26 | End: 2025-03-29

## 2025-03-26 RX ORDER — DIPHENHYDRAMINE HCL 12.5MG/5ML
25 ELIXIR ORAL ONCE
Refills: 0 | Status: DISCONTINUED | OUTPATIENT
Start: 2025-03-26 | End: 2025-03-29

## 2025-03-26 RX ORDER — METRONIDAZOLE 250 MG
500 TABLET ORAL EVERY 8 HOURS
Refills: 0 | Status: DISCONTINUED | OUTPATIENT
Start: 2025-03-26 | End: 2025-03-26

## 2025-03-26 RX ORDER — METRONIDAZOLE 250 MG
500 TABLET ORAL ONCE
Refills: 0 | Status: COMPLETED | OUTPATIENT
Start: 2025-03-26 | End: 2025-03-26

## 2025-03-26 RX ORDER — OXYCODONE HYDROCHLORIDE 30 MG/1
5 TABLET ORAL EVERY 6 HOURS
Refills: 0 | Status: DISCONTINUED | OUTPATIENT
Start: 2025-03-26 | End: 2025-03-29

## 2025-03-26 RX ORDER — SODIUM CHLORIDE 9 G/1000ML
1000 INJECTION, SOLUTION INTRAVENOUS ONCE
Refills: 0 | Status: COMPLETED | OUTPATIENT
Start: 2025-03-26 | End: 2025-03-26

## 2025-03-26 RX ORDER — CIPROFLOXACIN HCL 250 MG
400 TABLET ORAL ONCE
Refills: 0 | Status: COMPLETED | OUTPATIENT
Start: 2025-03-26 | End: 2025-03-26

## 2025-03-26 RX ORDER — MAGNESIUM SULFATE 500 MG/ML
2 SYRINGE (ML) INJECTION ONCE
Refills: 0 | Status: COMPLETED | OUTPATIENT
Start: 2025-03-26 | End: 2025-03-26

## 2025-03-26 RX ORDER — METRONIDAZOLE 250 MG
500 TABLET ORAL EVERY 8 HOURS
Refills: 0 | Status: DISCONTINUED | OUTPATIENT
Start: 2025-03-27 | End: 2025-03-29

## 2025-03-26 RX ORDER — CIPROFLOXACIN HCL 250 MG
400 TABLET ORAL ONCE
Refills: 0 | Status: DISCONTINUED | OUTPATIENT
Start: 2025-03-26 | End: 2025-03-26

## 2025-03-26 RX ORDER — CEFTRIAXONE 500 MG/1
1000 INJECTION, POWDER, FOR SOLUTION INTRAMUSCULAR; INTRAVENOUS ONCE
Refills: 0 | Status: COMPLETED | OUTPATIENT
Start: 2025-03-26 | End: 2025-03-26

## 2025-03-26 RX ORDER — CIPROFLOXACIN HCL 250 MG
400 TABLET ORAL EVERY 12 HOURS
Refills: 0 | Status: DISCONTINUED | OUTPATIENT
Start: 2025-03-26 | End: 2025-03-26

## 2025-03-26 RX ORDER — SODIUM CHLORIDE 9 G/1000ML
500 INJECTION, SOLUTION INTRAVENOUS ONCE
Refills: 0 | Status: COMPLETED | OUTPATIENT
Start: 2025-03-26 | End: 2025-03-26

## 2025-03-26 RX ORDER — CEFTRIAXONE 500 MG/1
INJECTION, POWDER, FOR SOLUTION INTRAMUSCULAR; INTRAVENOUS
Refills: 0 | Status: DISCONTINUED | OUTPATIENT
Start: 2025-03-26 | End: 2025-03-29

## 2025-03-26 RX ORDER — ENOXAPARIN SODIUM 100 MG/ML
40 INJECTION SUBCUTANEOUS EVERY 24 HOURS
Refills: 0 | Status: DISCONTINUED | OUTPATIENT
Start: 2025-03-26 | End: 2025-03-29

## 2025-03-26 RX ORDER — ACETAMINOPHEN 500 MG/5ML
650 LIQUID (ML) ORAL EVERY 6 HOURS
Refills: 0 | Status: DISCONTINUED | OUTPATIENT
Start: 2025-03-26 | End: 2025-03-29

## 2025-03-26 RX ORDER — METRONIDAZOLE 250 MG
TABLET ORAL
Refills: 0 | Status: DISCONTINUED | OUTPATIENT
Start: 2025-03-26 | End: 2025-03-26

## 2025-03-26 RX ADMIN — Medication 50 MILLIEQUIVALENT(S): at 23:16

## 2025-03-26 RX ADMIN — Medication 20 MILLIGRAM(S): at 01:06

## 2025-03-26 RX ADMIN — Medication 200 MILLIGRAM(S): at 04:30

## 2025-03-26 RX ADMIN — Medication 650 MILLIGRAM(S): at 11:15

## 2025-03-26 RX ADMIN — SODIUM CHLORIDE 1000 MILLILITER(S): 9 INJECTION, SOLUTION INTRAVENOUS at 01:30

## 2025-03-26 RX ADMIN — Medication 40 MILLIEQUIVALENT(S): at 13:55

## 2025-03-26 RX ADMIN — Medication 650 MILLIGRAM(S): at 23:16

## 2025-03-26 RX ADMIN — Medication 100 MILLIGRAM(S): at 21:24

## 2025-03-26 RX ADMIN — ENOXAPARIN SODIUM 40 MILLIGRAM(S): 100 INJECTION SUBCUTANEOUS at 11:15

## 2025-03-26 RX ADMIN — SODIUM CHLORIDE 100 MILLILITER(S): 9 INJECTION, SOLUTION INTRAVENOUS at 06:25

## 2025-03-26 RX ADMIN — SODIUM CHLORIDE 100 MILLILITER(S): 9 INJECTION, SOLUTION INTRAVENOUS at 21:24

## 2025-03-26 RX ADMIN — Medication 25 GRAM(S): at 23:16

## 2025-03-26 RX ADMIN — Medication 100 MILLIGRAM(S): at 04:30

## 2025-03-26 RX ADMIN — Medication 12.5 GRAM(S): at 13:05

## 2025-03-26 RX ADMIN — SODIUM CHLORIDE 500 MILLILITER(S): 9 INJECTION, SOLUTION INTRAVENOUS at 14:01

## 2025-03-26 RX ADMIN — CEFTRIAXONE 100 MILLIGRAM(S): 500 INJECTION, POWDER, FOR SOLUTION INTRAMUSCULAR; INTRAVENOUS at 09:44

## 2025-03-26 RX ADMIN — Medication 100 MILLIGRAM(S): at 15:21

## 2025-03-26 RX ADMIN — Medication 4 MILLIGRAM(S): at 01:06

## 2025-03-26 RX ADMIN — Medication 650 MILLIGRAM(S): at 17:22

## 2025-03-26 NOTE — H&P ADULT - ASSESSMENT
75y/o female w PMH covid, DVTs (2021, not on AC) and HTN that comes in with chief complaint of abdominal pain. Clinical presentation consistent with gallstone pancreatitis.    PLAN:  -Admit to surgery  -CLD  -IVFs  -Pain control  -Trend LFTs and lactate    spectra [phone]

## 2025-03-26 NOTE — ED PROVIDER NOTE - CLINICAL SUMMARY MEDICAL DECISION MAKING FREE TEXT BOX
Labs, EKG and imaging were ordered, where indicated.  Independent interpretation of any labs, EKG & imaging that was ordered was performed by Dr. Brnadie rivas. Appropriate medications for patient's presenting complaints were ordered and effects were reassessed, where indicated.  Patient's records (prior hospital, ED visit, and/or nursing home note) were reviewed, if available.  Additional history was obtained from EMS, family, and/or PCP (where available).  Escalation to admission/observation was considered.  Patient requires inpatient hospitalization - monitored setting.     chest/epigastric pain & vomiting 2/2 gallstone pancreatitis, cholecystitis - iv abx given, gen surg consulted, [place] [facility] [place], inpatient GI c/s

## 2025-03-26 NOTE — H&P ADULT - NSHPLABSRESULTS_GEN_ALL_CORE
LABS  CBC (03-26 @ 01:00)                              14.8                           12.62[H]  )----------------(  254        --    % Neutrophils, --    % Lymphocytes, ANC: --                                  43.1      BMP (03-26 @ 01:00)             142     |  99      |  17    		Ca++ --      Ca 9.7                ---------------------------------( 153[H]		Mg --                 3.5     |  28      |  1.0   			Ph --        LFTs (03-26 @ 01:58)      TPro -- / Alb -- / TBili -- / DBili 0.8[H] / AST -- / ALT -- / AlkPhos --  LFTs (03-26 @ 01:00)      TPro 6.9 / Alb 4.3 / TBili 1.2 / DBili -- / [H] / [H] / AlkPhos 158[H]    Coags (03-26 @ 04:05)  aPTT 24.1[L] / INR 0.88 / PT 10.30      ABG (03-26 @ 01:00)      /  /  /  /  / %     Lactate:  2.6[H]      --------------------------------------------------------------------------------------------    MICROBIOLOGY  Urinalysis (03-26 @ 01:00):     Color:  / Appearance:  / SG:  / pH:  / Gluc: 153[H] / Ketones:  / Bili:  / Urobili:  / Protein : / Nitrites:  / Leuk.Est:  / RBC:  / WBC:  / Sq Epi:  / Non Sq Epi:  / Bacteria          --------------------------------------------------------------------------------------------    LORENAs Summary

## 2025-03-26 NOTE — ED PROVIDER NOTE - PHYSICAL EXAMINATION
elderly F  skin warm, dry  ncat  neck supple  rrr nl s1s2 no mrg  ctab no wrr  abd soft, +epigastric/ruq ttp, rest non-tender, no palpable masses no rgr  back non-tender no cvat  ext no cce dpi  neuro aaox3 grossly nf exam

## 2025-03-26 NOTE — H&P ADULT - NSICDXPASTMEDICALHX_GEN_ALL_CORE_FT
PAST MEDICAL HISTORY:  DVT, lower extremity     Infection due to severe acute respiratory syndrome coronavirus 2 (SARS-CoV-2) in 01/2021

## 2025-03-26 NOTE — H&P ADULT - ATTENDING COMMENTS
Trauma/Acute Care Surgery Attending Note Attestation  Patient was seen and examined bedside.  I reviewed the resident/PA note and agreed with above assessment and plan with following additions and corrections.    History as above. During my evaluation, patient reports pain has improved by the time of my evaluation.    T(C): 36.9 (03-26-25 @ 20:19), Max: 37.2 (03-26-25 @ 05:38)  HR: 67 (03-26-25 @ 20:19) (67 - 92)  BP: 98/50 (03-26-25 @ 20:19) (90/54 - 168/89)  RR: 18 (03-26-25 @ 20:19) (18 - 19)  SpO2: 96% (03-26-25 @ 20:19) (95% - 98%)    I independently performed a medically appropriate exam. The exam was notable for epigastric tenderness to palpation.                          12.9   12.99 )-----------( 250      ( 26 Mar 2025 11:02 )             37.8     03-26    140  |  100  |  14  ----------------------------<  117[H]  3.6   |  30  |  1.0    Ca 9.1; Mg 1.6[L]; Phos 3.2      ( 26 Mar 2025 11:02 )  Alb: 3.8 g/dL / Pro: 5.8 g/dL / AlkPhos: 146 U/L / ALT: 649 U/L / AST: 838 U/L / GGT:x     / Tbili 2.1 mg/dL/ Dbili 1.4 mg/dL / Indbili 0.7 mg/dL    PT/INR/PTT - ( 26 Mar 2025 04:05 )   PT: 10.30 sec; INR: 0.88 ratio; PTT 24.1 sec       Lactate, Blood: 2.0 mmol/L (03-26 @ 11:02)  Lactate, Blood: 2.6 mmol/L (03-26 @ 01:00)    Lipase 1065    CT A/P reviewed and interpreted by me: gallbladder wall thickening and edema  RUQ U/S reviewed and interpreted by me: cholelithiasis with gallbladder wall thickening    Assessment/Plan:  74y Female PMH DVT, HTN who presents with gallstone pancreatitis. No acute surgical intervention. Admit to surgery. Monitor abdominal exam. Supportive care for pancreatitis. Trend LFTs. If LFTs uptrend, will consult GI for evaluation for ERCP. Plan for cholecystectomy when patient's pain has improved.     Nathan Coffman MD  Trauma/Acute Care Surgery/Surgical Critical Care Attending

## 2025-03-26 NOTE — CONSULT NOTE ADULT - ATTENDING COMMENTS
Patient with gallstone pancreatitis with cholecystitis and transaminitis, rule out choledocholithiasis. Abdomen is soft,  Independently reviewed labs and imaging.  Will consider ERCP if worsening LFTs or any evidence of cholangitis. Currently, patient is comfortable in bed, epigastric pain resolved, likely passes stone. Recommend IV hydration, trend LFTs, please inform GI stat if patient developed worsening abdominal pain, sepsis or any evidence of cholangitis. Rest of recommendations as above

## 2025-03-26 NOTE — ED ADULT NURSE NOTE - NSFALLHARMRISKINTERV_ED_ALL_ED
Assistance OOB with selected safe patient handling equipment if applicable/Assistance with ambulation/Communicate risk of Fall with Harm to all staff, patient, and family/Encourage patient to sit up slowly, dangle for a short time, stand at bedside before walking/Monitor gait and stability/Orthostatic vital signs/Provide patient with walking aids/Provide visual cue: red socks, yellow wristband, yellow gown, etc/Reinforce activity limits and safety measures with patient and family/Review medications for side effects contributing to fall risk/Toileting schedule using arm’s reach rule for commode and bathroom/Bed in lowest position, wheels locked, appropriate side rails in place/Call bell, personal items and telephone in reach/Instruct patient to call for assistance before getting out of bed/chair/stretcher/Non-slip footwear applied when patient is off stretcher/[place] to call system/Physically safe environment - no spills, clutter or unnecessary equipment/Purposeful Proactive Rounding/Room/bathroom lighting operational, light cord in reach

## 2025-03-26 NOTE — CONSULT NOTE ADULT - TIME BILLING
Reviewed all pertinent clinical information and reviewed all relevant imaging and diagnostic studies.  Counseled the patient  about diagnostic testing and treatment plan. All questions were answered.  Discussed recommendations with the primary team and coordinated care.

## 2025-03-26 NOTE — CONSULT NOTE ADULT - ASSESSMENT
75y/o female w PMH covid, DVTs (2021, not on AC) and HTN that comes in with chief complaint of epigastric abdominal pain. Pt reports pain is localized in her epigastric area 10/10 sharp in character radiating to the back since 8pm last night. Patient reports some nausea and 1 episode of non bloody emesis. Reports having similar pain but milder 1 year ago. Pt denies any fevers, chills. Labs revealing elevated liver enzymes with cholecystitis. [facility] consulted for further evaluation    #Epigastric abdominal pain secondary to gallstone pancreatitis with acute cholecystitis  #Elevated liver enzymes r/o choledocho  #Hepatic steatosis, morbid obesity  T narcisa 2.1         03-26-25 @ 11:02  T narcisa 1.2         03-26-25 @ 01:00  Lipase 1065  CTAP : unremarkable  RUQ US: gall stones with GB wall edema. CBD 4mm hepatic steatosis  WBC 12k    RECS  - Recommend NPO  - Trend LFTs, if uptrending or concern for cholangitis will consider ERCP in 24-48 hours  - IVF  - Monitor for pain, recommend bowel regimen if on narcotics  - Recommend surgery eval for CCY     75y/o female w PMH covid, DVTs (2021, not on AC) and HTN that comes in with chief complaint of epigastric abdominal pain. Pt reports pain is localized in her epigastric area 10/10 sharp in character radiating to the back since 8pm last night. Patient reports some nausea and 1 episode of non bloody emesis. Reports having similar pain but milder 1 year ago. Pt denies any fevers, chills. Labs revealing elevated liver enzymes with cholecystitis. [facility] consulted for further evaluation    #Epigastric abdominal pain secondary to gallstone pancreatitis with acute cholecystitis  #Elevated liver enzymes r/o choledocho  #Hepatic steatosis, morbid obesity  T narcisa 2.1         03-26-25 @ 11:02  T narcisa 1.2         03-26-25 @ 01:00  Lipase 1065  CTAP : unremarkable  RUQ US: gall stones with GB wall edema. CBD 4mm hepatic steatosis  WBC 12k    RECS  - Recommend NPO  - Trend LFTs, will consider ERCP  - IVF  - Monitor for pain, recommend bowel regimen if on narcotics  - Recommend surgery eval for CCY     73y/o female w PMH covid, DVTs (2021, not on AC) and HTN that comes in with chief complaint of epigastric abdominal pain. Pt reports pain is localized in her epigastric area 10/10 sharp in character radiating to the back since 8pm last night. Patient reports some nausea and 1 episode of non bloody emesis. Reports having similar pain but milder 1 year ago. Pt denies any fevers, chills. Labs revealing elevated liver enzymes with cholecystitis. [facility] consulted for further evaluation    #Epigastric abdominal pain secondary to gallstone pancreatitis with acute cholecystitis  #Elevated liver enzymes r/o choledocho  #Hepatic steatosis, morbid obesity  T narcisa 2.1         03-26-25 @ 11:02  T narcisa 1.2         03-26-25 @ 01:00  Lipase 1065  CTAP : unremarkable  RUQ US: gall stones with GB wall edema. CBD 4mm hepatic steatosis  WBC 12k    RECS  - Recommend NPO  - Trend LFTs, will consider ERCP if uptrending LFTs /cholangitis in 24-48hrs  - IVF  - Monitor for pain, recommend bowel regimen if on narcotics  - Recommend surgery eval for CCY     73y/o female w PMH covid, DVTs (2021, not on AC) and HTN that comes in with chief complaint of epigastric abdominal pain. Pt reports pain is localized in her epigastric area 10/10 sharp in character radiating to the back since 8pm last night. Patient reports some nausea and 1 episode of non bloody emesis. Reports having similar pain but milder 1 year ago. Pt denies any fevers, chills. Labs revealing elevated liver enzymes with cholecystitis. [facility] consulted for further evaluation    #Epigastric abdominal pain secondary to gallstone pancreatitis with acute cholecystitis  #Elevated liver enzymes r/o choledocho  #Hepatic steatosis, morbid obesity  T narcisa 2.1         03-26-25 @ 11:02  T narcisa 1.2         03-26-25 @ 01:00  Lipase 1065  CTAP : unremarkable  RUQ US: gall stones with GB wall edema. CBD 4mm hepatic steatosis  WBC 12k    RECS  - Recommend NPO  - Trend LFTs, will consider ERCP if uptrending LFTs or cholangitis  - IVF  - Monitor for pain, recommend bowel regimen if on narcotics  - Recommend surgery eval for CCY    #HCM: Follow-up with our [facility] [phone]

## 2025-03-26 NOTE — CONSULT NOTE ADULT - SUBJECTIVE AND OBJECTIVE BOX
Gastroenterology Consultation:    Patient is a 74y old  Female who presents with a chief complaint of       Admitted on: 03-26-25      HPI:  75y/o female w PMH covid, DVTs (2021, not on AC) and HTN that comes in with chief complaint of epigastric abdominal pain. Pt reports pain is localized in her epigastric area 10/10 sharp in character radiating to the back since 8pm last night. Patient reports some nausea and 1 episode of non bloody emesis. Reports having similar pain but milder 1 year ago. Pt denies any fevers, chills. Labs revealing elevated liver enzymes with cholecystitis. advance GI consulted for further evaluation      Prior EGD:    Prior Colonoscopy: 1 year ago , normal per patient      PAST MEDICAL & SURGICAL HISTORY:  Infection due to severe acute respiratory syndrome coronavirus 2 (SARS-CoV-2)  in 01/2021      DVT, lower extremity      S/P appendectomy            FAMILY HISTORY:  Family history non-contributory  No FHx of thrombophilias, bleeding disorders, malignancies        Social History:  Tobacco: denies  Alcohol: denies  Drugs:denies    Home Medications:        MEDICATIONS  (STANDING):  acetaminophen     Tablet .. 650 milliGRAM(s) Oral every 6 hours  cefTRIAXone   IVPB      enoxaparin Injectable 40 milliGRAM(s) SubCutaneous every 24 hours  lactated ringers. 1000 milliLiter(s) (100 mL/Hr) IV Continuous <Continuous>  metroNIDAZOLE  IVPB 500 milliGRAM(s) IV Intermittent every 8 hours  metroNIDAZOLE  IVPB 500 milliGRAM(s) IV Intermittent once  metroNIDAZOLE  IVPB        MEDICATIONS  (PRN):  diphenhydrAMINE Injectable 25 milliGRAM(s) IV Push once PRN Allergy symptoms  oxyCODONE    IR 5 milliGRAM(s) Oral every 6 hours PRN Moderate Pain (4 - 6)      Allergies  penicillin (Unknown)      Review of Systems:   Constitutional:  No Fever, No Chills  ENT/Mouth:  No Hearing Changes,  No Difficulty Swallowing  Eyes:  No Eye Pain, No Vision Changes  Cardiovascular:  No Chest Pain, No Palpitations  Respiratory:  No Cough, No Dyspnea  Gastrointestinal:  As described in HPI          Physical Examination:  T(C): 37 (03-26-25 @ 08:03), Max: 37.2 (03-26-25 @ 05:38)  HR: 90 (03-26-25 @ 12:50) (68 - 92)  BP: 90/54 (03-26-25 @ 12:50) (90/54 - 168/89)  RR: 19 (03-26-25 @ 08:03) (18 - 19)  SpO2: 96% (03-26-25 @ 08:03) (95% - 98%)    Weight (kg): 65.8 (03-25-25 @ 22:27)        GENERAL: AAOx3, no acute distress.  HEAD:  Atraumatic, Normocephalic  EYES: conjunctiva and sclera clear  CHEST/LUNG: Clear to auscultation bilaterally; No wheeze, rhonchi, or rales  HEART: Regular rate and rhythm; normal S1, S2, No murmurs.  ABDOMEN: Soft, mildly tender, nondistended; Bowel sounds present  SKIN: Intact, no jaundice        Data:                        12.9   12.99 )-----------( 250      ( 26 Mar 2025 11:02 )             37.8     Hgb Trend:  12.9  03-26-25 @ 11:02  14.8  03-26-25 @ 01:00      03-26    140  |  100  |  14  ----------------------------<  117[H]  3.6   |  30  |  1.0    Ca    9.1      26 Mar 2025 11:02  Phos  3.2     03-26  Mg     1.6     03-26    TPro  5.8[L]  /  Alb  3.8  /  TBili  2.1[H]  /  DBili  1.4[H]  /  AST  838[H]  /  ALT  649[H]  /  AlkPhos  146[H]  03-26    Liver panel trend:  TBili 2.1   /      /      /   AlkP 146   /   Tptn 5.8   /   Alb 3.8    /   DBili 1.4      03-26  TBili --   /   AST --   /   ALT --   /   AlkP --   /   Tptn --   /   Alb --    /   DBili 0.8      03-26  TBili 1.2   /      /      /   AlkP 158   /   Tptn 6.9   /   Alb 4.3    /   DBili --      03-26      PT/INR - ( 26 Mar 2025 04:05 )   PT: 10.30 sec;   INR: 0.88 ratio         PTT - ( 26 Mar 2025 04:05 )  PTT:24.1 sec        Radiology:  CT Abdomen and Pelvis w/ IV Cont:   ACC: [id number] EXAM:  CT ABDOMEN AND PELVIS IC   ORDERED BY: PATRICK NIEVES     PROCEDURE DATE:  03/26/2025          INTERPRETATION:  CLINICAL STATEMENT: Abdominal pain. Vomiting.   Transaminitis.    TECHNIQUE: Contiguous axial CT images were obtained from the lower chest   to the pubic symphysis following administration of 100cc Omnipaque 350   intravenous contrast.  Oral contrast was not administered.  Reformatted   images in the coronal and sagittal planes were acquired.    COMPARISON: CT chest 6/19/2021.    FINDINGS:    LOWER CHEST: Unremarkable.    HEPATOBILIARY: Hepatic steatosis. Nonspecific gallbladder wall   thickening/edema. No biliary dilation.    SPLEEN: Unremarkable.    PANCREAS: Unremarkable.    ADRENAL GLANDS: Right adrenal 1.8 cm adenoma, unchanged.    KIDNEYS: No hydronephrosis. Subcentimeter left renal hypodensity, to   characterize    ABDOMINOPELVIC NODES: Unremarkable.    PELVIC ORGANS: Unremarkable.    PERITONEUM/MESENTERY/BOWEL: Colonic diverticulosis. No bowel obstruction.   No ascites. No pneumoperitoneum..    BONES/SOFT TISSUES: Unremarkable.    VASCULAR: Unremarkable.    OTHER: Small fat-containing umbilical hernia.      IMPRESSION:    Nonspecific gallbladder wall thickening/edema, consider cholecystitis in   the appropriate clinical setting.  Hepatic steatosis.  Otherwise unremarkable CT.    --- End of Report ---            BALTAZAR GARDNER MD; Attending Radiologist  This document has been electronically signed. Mar 26 2025  3:00AM (03-26-25 @ 02:32)    US Abdomen Upper Quadrant Right:   ACC: [id number] EXAM:  US ABDOMEN RT UPR QUADRANT   ORDERED BY: PATRICK NIEVES     PROCEDURE DATE:  03/26/2025          INTERPRETATION:  CLINICAL INFORMATION: Abdominal pain    COMPARISON: CT abdomen and pelvis 3/26/2025    TECHNIQUE: Sonography of the right upper quadrant.    FINDINGS:  Hepatic steatosis.  Bile ducts: Normal caliber. Common bile duct measures 4 mm.  Cholelithiasis. Nonspecific abnormal gallbladder wall thickening/edema.   Negative reported sonographic Murillo sign.  Pancreas obscured by overlying bowel gas.  Right kidney: 10.9 cm. No hydronephrosis.  Ascites: None.  IVC: Visualized portions are within normal limits.    IMPRESSION:  Cholelithiasis. Nonspecific abnormal gallbladder wall thickening/edema.   Negative reported sonographic Murillo sign.  Hepatic steatosis.    --- End of Report ---            BALTAZAR GARDNER MD; Attending Radiologist  This document has been electronically signed. Mar 26 2025  3:02AM (03-26-25 @ 02:42)     Gastroenterology Consultation:    Patient is a 74y old  Female who presents with a chief complaint of       Admitted on: 03-26-25      HPI:  73y/o female w PMH COVID, DVTs (2021, not on AC) and HTN that comes in with chief complaint of epigastric abdominal pain. Pt reports pain is localized in her epigastric area 10/10 sharp in character radiating to the back since 8pm last night. Patient reports some nausea and 1 episode of non bloody emesis. Reports having similar pain but milder 1 year ago. Pt denies any fevers, chills. Labs revealing elevated liver enzymes with cholecystitis. advance GI consulted for further evaluation        Prior Colonoscopy: 1 year ago , normal per patient      PAST MEDICAL & SURGICAL HISTORY:  Infection due to severe acute respiratory syndrome coronavirus 2 (SARS-CoV-2)  in 01/2021      DVT, lower extremity      S/P appendectomy            FAMILY HISTORY:  Family history non-contributory  No FHx of thrombophilias, bleeding disorders, malignancies        Social History:  Tobacco: denies  Alcohol: denies  Drugs:denies    Home Medications:        MEDICATIONS  (STANDING):  acetaminophen     Tablet .. 650 milliGRAM(s) Oral every 6 hours  cefTRIAXone   IVPB      enoxaparin Injectable 40 milliGRAM(s) SubCutaneous every 24 hours  lactated ringers. 1000 milliLiter(s) (100 mL/Hr) IV Continuous <Continuous>  metroNIDAZOLE  IVPB 500 milliGRAM(s) IV Intermittent every 8 hours  metroNIDAZOLE  IVPB 500 milliGRAM(s) IV Intermittent once  metroNIDAZOLE  IVPB        MEDICATIONS  (PRN):  diphenhydrAMINE Injectable 25 milliGRAM(s) IV Push once PRN Allergy symptoms  oxyCODONE    IR 5 milliGRAM(s) Oral every 6 hours PRN Moderate Pain (4 - 6)      Allergies  penicillin (Unknown)      Review of Systems:   Constitutional:  No Fever, No Chills  ENT/Mouth:  No Hearing Changes,  No Difficulty Swallowing  Eyes:  No Eye Pain, No Vision Changes  Cardiovascular:  No Chest Pain, No Palpitations  Respiratory:  No Cough, No Dyspnea  Gastrointestinal:  As described in HPI          Physical Examination:  T(C): 37 (03-26-25 @ 08:03), Max: 37.2 (03-26-25 @ 05:38)  HR: 90 (03-26-25 @ 12:50) (68 - 92)  BP: 90/54 (03-26-25 @ 12:50) (90/54 - 168/89)  RR: 19 (03-26-25 @ 08:03) (18 - 19)  SpO2: 96% (03-26-25 @ 08:03) (95% - 98%)    Weight (kg): 65.8 (03-25-25 @ 22:27)        GENERAL: AAOx3, no acute distress.  HEAD:  Atraumatic, Normocephalic  EYES: conjunctiva and sclera clear  CHEST/LUNG: Clear to auscultation bilaterally; No wheeze, rhonchi, or rales  HEART: Regular rate and rhythm; normal S1, S2, No murmurs.  ABDOMEN: Soft, mildly tender, nondistended; Bowel sounds present  SKIN: Intact, no jaundice        Data:                        12.9   12.99 )-----------( 250      ( 26 Mar 2025 11:02 )             37.8     Hgb Trend:  12.9  03-26-25 @ 11:02  14.8  03-26-25 @ 01:00      03-26    140  |  100  |  14  ----------------------------<  117[H]  3.6   |  30  |  1.0    Ca    9.1      26 Mar 2025 11:02  Phos  3.2     03-26  Mg     1.6     03-26    TPro  5.8[L]  /  Alb  3.8  /  TBili  2.1[H]  /  DBili  1.4[H]  /  AST  838[H]  /  ALT  649[H]  /  AlkPhos  146[H]  03-26    Liver panel trend:  TBili 2.1   /      /      /   AlkP 146   /   Tptn 5.8   /   Alb 3.8    /   DBili 1.4      03-26  TBili --   /   AST --   /   ALT --   /   AlkP --   /   Tptn --   /   Alb --    /   DBili 0.8      03-26  TBili 1.2   /      /      /   AlkP 158   /   Tptn 6.9   /   Alb 4.3    /   DBili --      03-26      PT/INR - ( 26 Mar 2025 04:05 )   PT: 10.30 sec;   INR: 0.88 ratio         PTT - ( 26 Mar 2025 04:05 )  PTT:24.1 sec        Radiology:  CT Abdomen and Pelvis w/ IV Cont:   ACC: [id number] EXAM:  CT ABDOMEN AND PELVIS IC   ORDERED BY: PATRICK NIEVES     PROCEDURE DATE:  03/26/2025          INTERPRETATION:  CLINICAL STATEMENT: Abdominal pain. Vomiting.   Transaminitis.    TECHNIQUE: Contiguous axial CT images were obtained from the lower chest   to the pubic symphysis following administration of 100cc Omnipaque 350   intravenous contrast.  Oral contrast was not administered.  Reformatted   images in the coronal and sagittal planes were acquired.    COMPARISON: CT chest 6/19/2021.    FINDINGS:    LOWER CHEST: Unremarkable.    HEPATOBILIARY: Hepatic steatosis. Nonspecific gallbladder wall   thickening/edema. No biliary dilation.    SPLEEN: Unremarkable.    PANCREAS: Unremarkable.    ADRENAL GLANDS: Right adrenal 1.8 cm adenoma, unchanged.    KIDNEYS: No hydronephrosis. Subcentimeter left renal hypodensity, to   characterize    ABDOMINOPELVIC NODES: Unremarkable.    PELVIC ORGANS: Unremarkable.    PERITONEUM/MESENTERY/BOWEL: Colonic diverticulosis. No bowel obstruction.   No ascites. No pneumoperitoneum..    BONES/SOFT TISSUES: Unremarkable.    VASCULAR: Unremarkable.    OTHER: Small fat-containing umbilical hernia.      IMPRESSION:    Nonspecific gallbladder wall thickening/edema, consider cholecystitis in   the appropriate clinical setting.  Hepatic steatosis.  Otherwise unremarkable CT.    --- End of Report ---            BALTAZAR GARDNER MD; Attending Radiologist  This document has been electronically signed. Mar 26 2025  3:00AM (03-26-25 @ 02:32)    US Abdomen Upper Quadrant Right:   ACC: [id number] EXAM:  US ABDOMEN RT UPR QUADRANT   ORDERED BY: PATRICK NIEVES     PROCEDURE DATE:  03/26/2025          INTERPRETATION:  CLINICAL INFORMATION: Abdominal pain    COMPARISON: CT abdomen and pelvis 3/26/2025    TECHNIQUE: Sonography of the right upper quadrant.    FINDINGS:  Hepatic steatosis.  Bile ducts: Normal caliber. Common bile duct measures 4 mm.  Cholelithiasis. Nonspecific abnormal gallbladder wall thickening/edema.   Negative reported sonographic Murillo sign.  Pancreas obscured by overlying bowel gas.  Right kidney: 10.9 cm. No hydronephrosis.  Ascites: None.  IVC: Visualized portions are within normal limits.    IMPRESSION:  Cholelithiasis. Nonspecific abnormal gallbladder wall thickening/edema.   Negative reported sonographic Murillo sign.  Hepatic steatosis.    --- End of Report ---            BALTAZAR GARDNER MD; Attending Radiologist  This document has been electronically signed. Mar 26 2025  3:02AM (03-26-25 @ 02:42)

## 2025-03-26 NOTE — H&P ADULT - HISTORY OF PRESENT ILLNESS
73y/o female w PMH covid, DVTs (2021, not on AC) and HTN that comes in with chief complaint of abdominal pain. Pt reports pain is localized in her epigastric area w a moderate intensity that has been constant since it began. Patient reports some nausea and 1 episode of non bloody emesis. Pt denies any fevers, chills or previous similar episodes.

## 2025-03-26 NOTE — ED ADULT NURSE NOTE - NS_SISCREENINGSR_GEN_ALL_ED
Last time medication was refilled 7/20/2020  Quantity and number of refills 30 w/0  Last OV 5/21/2020  Next OV 8/2020 Negative

## 2025-03-26 NOTE — ED PROVIDER NOTE - CARE PLAN
1 Principal Discharge DX:	Gallstone pancreatitis  Secondary Diagnosis:	Cholecystitis  Secondary Diagnosis:	Transaminitis

## 2025-03-26 NOTE — PATIENT PROFILE ADULT - FALL HARM RISK - HARM RISK INTERVENTIONS

## 2025-03-27 LAB
ALBUMIN SERPL ELPH-MCNC: 3.2 G/DL — LOW (ref 3.5–5.2)
ALBUMIN SERPL ELPH-MCNC: 3.5 G/DL — SIGNIFICANT CHANGE UP (ref 3.5–5.2)
ALP SERPL-CCNC: 142 U/L — HIGH (ref 30–115)
ALP SERPL-CCNC: 158 U/L — HIGH (ref 30–115)
ALT FLD-CCNC: 329 U/L — HIGH (ref 0–41)
ALT FLD-CCNC: 442 U/L — HIGH (ref 0–41)
ANION GAP SERPL CALC-SCNC: 11 MMOL/L — SIGNIFICANT CHANGE UP (ref 7–14)
ANION GAP SERPL CALC-SCNC: 12 MMOL/L — SIGNIFICANT CHANGE UP (ref 7–14)
AST SERPL-CCNC: 213 U/L — HIGH (ref 0–41)
AST SERPL-CCNC: 332 U/L — HIGH (ref 0–41)
BASOPHILS # BLD AUTO: 0.02 K/UL — SIGNIFICANT CHANGE UP (ref 0–0.2)
BASOPHILS NFR BLD AUTO: 0.4 % — SIGNIFICANT CHANGE UP (ref 0–1)
BILIRUB DIRECT SERPL-MCNC: 0.7 MG/DL — HIGH (ref 0–0.3)
BILIRUB DIRECT SERPL-MCNC: 2.1 MG/DL — HIGH (ref 0–0.3)
BILIRUB INDIRECT FLD-MCNC: 0.4 MG/DL — SIGNIFICANT CHANGE UP (ref 0.2–1.2)
BILIRUB INDIRECT FLD-MCNC: 1 MG/DL — SIGNIFICANT CHANGE UP (ref 0.2–1.2)
BILIRUB SERPL-MCNC: 1.1 MG/DL — SIGNIFICANT CHANGE UP (ref 0.2–1.2)
BILIRUB SERPL-MCNC: 3.1 MG/DL — HIGH (ref 0.2–1.2)
BUN SERPL-MCNC: 10 MG/DL — SIGNIFICANT CHANGE UP (ref 10–20)
BUN SERPL-MCNC: 10 MG/DL — SIGNIFICANT CHANGE UP (ref 10–20)
CALCIUM SERPL-MCNC: 8.3 MG/DL — LOW (ref 8.4–10.5)
CALCIUM SERPL-MCNC: 8.8 MG/DL — SIGNIFICANT CHANGE UP (ref 8.4–10.5)
CHLORIDE SERPL-SCNC: 101 MMOL/L — SIGNIFICANT CHANGE UP (ref 98–110)
CHLORIDE SERPL-SCNC: 104 MMOL/L — SIGNIFICANT CHANGE UP (ref 98–110)
CO2 SERPL-SCNC: 26 MMOL/L — SIGNIFICANT CHANGE UP (ref 17–32)
CO2 SERPL-SCNC: 29 MMOL/L — SIGNIFICANT CHANGE UP (ref 17–32)
CREAT SERPL-MCNC: 0.9 MG/DL — SIGNIFICANT CHANGE UP (ref 0.7–1.5)
CREAT SERPL-MCNC: 1 MG/DL — SIGNIFICANT CHANGE UP (ref 0.7–1.5)
EGFR: 60 ML/MIN/1.73M2 — SIGNIFICANT CHANGE UP
EGFR: 60 ML/MIN/1.73M2 — SIGNIFICANT CHANGE UP
EGFR: 68 ML/MIN/1.73M2 — SIGNIFICANT CHANGE UP
EGFR: 68 ML/MIN/1.73M2 — SIGNIFICANT CHANGE UP
EOSINOPHIL # BLD AUTO: 0.14 K/UL — SIGNIFICANT CHANGE UP (ref 0–0.7)
EOSINOPHIL NFR BLD AUTO: 2.5 % — SIGNIFICANT CHANGE UP (ref 0–8)
GLUCOSE SERPL-MCNC: 83 MG/DL — SIGNIFICANT CHANGE UP (ref 70–99)
GLUCOSE SERPL-MCNC: 84 MG/DL — SIGNIFICANT CHANGE UP (ref 70–99)
HCT VFR BLD CALC: 36.1 % — LOW (ref 37–47)
HGB BLD-MCNC: 12.3 G/DL — SIGNIFICANT CHANGE UP (ref 12–16)
IMM GRANULOCYTES NFR BLD AUTO: 0.5 % — HIGH (ref 0.1–0.3)
LYMPHOCYTES # BLD AUTO: 0.88 K/UL — LOW (ref 1.2–3.4)
LYMPHOCYTES # BLD AUTO: 15.7 % — LOW (ref 20.5–51.1)
MAGNESIUM SERPL-MCNC: 1.8 MG/DL — SIGNIFICANT CHANGE UP (ref 1.8–2.4)
MAGNESIUM SERPL-MCNC: 2.1 MG/DL — SIGNIFICANT CHANGE UP (ref 1.8–2.4)
MCHC RBC-ENTMCNC: 28.9 PG — SIGNIFICANT CHANGE UP (ref 27–31)
MCHC RBC-ENTMCNC: 34.1 G/DL — SIGNIFICANT CHANGE UP (ref 32–37)
MCV RBC AUTO: 84.7 FL — SIGNIFICANT CHANGE UP (ref 81–99)
MONOCYTES # BLD AUTO: 0.32 K/UL — SIGNIFICANT CHANGE UP (ref 0.1–0.6)
MONOCYTES NFR BLD AUTO: 5.7 % — SIGNIFICANT CHANGE UP (ref 1.7–9.3)
NEUTROPHILS # BLD AUTO: 4.2 K/UL — SIGNIFICANT CHANGE UP (ref 1.4–6.5)
NEUTROPHILS NFR BLD AUTO: 75.2 % — SIGNIFICANT CHANGE UP (ref 42.2–75.2)
NRBC BLD AUTO-RTO: 0 /100 WBCS — SIGNIFICANT CHANGE UP (ref 0–0)
PHOSPHATE SERPL-MCNC: 3 MG/DL — SIGNIFICANT CHANGE UP (ref 2.1–4.9)
PLATELET # BLD AUTO: 213 K/UL — SIGNIFICANT CHANGE UP (ref 130–400)
PMV BLD: 10.4 FL — SIGNIFICANT CHANGE UP (ref 7.4–10.4)
POTASSIUM SERPL-MCNC: 3.5 MMOL/L — SIGNIFICANT CHANGE UP (ref 3.5–5)
POTASSIUM SERPL-MCNC: 4.1 MMOL/L — SIGNIFICANT CHANGE UP (ref 3.5–5)
POTASSIUM SERPL-SCNC: 3.5 MMOL/L — SIGNIFICANT CHANGE UP (ref 3.5–5)
POTASSIUM SERPL-SCNC: 4.1 MMOL/L — SIGNIFICANT CHANGE UP (ref 3.5–5)
PROT SERPL-MCNC: 5.5 G/DL — LOW (ref 6–8)
PROT SERPL-MCNC: 6 G/DL — SIGNIFICANT CHANGE UP (ref 6–8)
RBC # BLD: 4.26 M/UL — SIGNIFICANT CHANGE UP (ref 4.2–5.4)
RBC # FLD: 13.7 % — SIGNIFICANT CHANGE UP (ref 11.5–14.5)
SODIUM SERPL-SCNC: 141 MMOL/L — SIGNIFICANT CHANGE UP (ref 135–146)
SODIUM SERPL-SCNC: 142 MMOL/L — SIGNIFICANT CHANGE UP (ref 135–146)
WBC # BLD: 5.59 K/UL — SIGNIFICANT CHANGE UP (ref 4.8–10.8)
WBC # FLD AUTO: 5.59 K/UL — SIGNIFICANT CHANGE UP (ref 4.8–10.8)

## 2025-03-27 PROCEDURE — 99232 SBSQ HOSP IP/OBS MODERATE 35: CPT | Mod: 57

## 2025-03-27 RX ADMIN — Medication 650 MILLIGRAM(S): at 05:33

## 2025-03-27 RX ADMIN — SODIUM CHLORIDE 100 MILLILITER(S): 9 INJECTION, SOLUTION INTRAVENOUS at 05:33

## 2025-03-27 RX ADMIN — Medication 650 MILLIGRAM(S): at 12:27

## 2025-03-27 RX ADMIN — Medication 50 MILLIEQUIVALENT(S): at 01:45

## 2025-03-27 RX ADMIN — Medication 650 MILLIGRAM(S): at 11:57

## 2025-03-27 RX ADMIN — Medication 650 MILLIGRAM(S): at 00:00

## 2025-03-27 RX ADMIN — Medication 100 MILLIGRAM(S): at 05:33

## 2025-03-27 RX ADMIN — Medication 20 MILLIEQUIVALENT(S): at 23:02

## 2025-03-27 RX ADMIN — Medication 650 MILLIGRAM(S): at 17:11

## 2025-03-27 RX ADMIN — SODIUM CHLORIDE 100 MILLILITER(S): 9 INJECTION, SOLUTION INTRAVENOUS at 18:04

## 2025-03-27 RX ADMIN — Medication 100 MILLIGRAM(S): at 21:16

## 2025-03-27 RX ADMIN — Medication 100 MILLIGRAM(S): at 14:56

## 2025-03-27 RX ADMIN — ENOXAPARIN SODIUM 40 MILLIGRAM(S): 100 INJECTION SUBCUTANEOUS at 11:57

## 2025-03-27 RX ADMIN — CEFTRIAXONE 100 MILLIGRAM(S): 500 INJECTION, POWDER, FOR SOLUTION INTRAMUSCULAR; INTRAVENOUS at 10:50

## 2025-03-27 RX ADMIN — Medication 50 MILLIEQUIVALENT(S): at 03:50

## 2025-03-27 NOTE — PROGRESS NOTE ADULT - ASSESSMENT
73y/o female w PMH covid, DVTs (2021, not on AC) and HTN that comes in with chief complaint of abdominal pain. Clinical presentation consistent with gallstone pancreatitis.    PLAN:  - f/u GI for ERCP, given LFTs continue to uptrend  - Plan for CCY today, may hold off until ERCP  - preopd, consent in chart  - NPO w/ IVF    spectra [id number]

## 2025-03-28 ENCOUNTER — RESULT REVIEW (OUTPATIENT)
Age: 75
End: 2025-03-28

## 2025-03-28 LAB
ALBUMIN SERPL ELPH-MCNC: 3.3 G/DL — LOW (ref 3.5–5.2)
ALBUMIN SERPL ELPH-MCNC: 3.3 G/DL — LOW (ref 3.5–5.2)
ALP SERPL-CCNC: 133 U/L — HIGH (ref 30–115)
ALP SERPL-CCNC: 153 U/L — HIGH (ref 30–115)
ALT FLD-CCNC: 247 U/L — HIGH (ref 0–41)
ALT FLD-CCNC: 282 U/L — HIGH (ref 0–41)
ANION GAP SERPL CALC-SCNC: 10 MMOL/L — SIGNIFICANT CHANGE UP (ref 7–14)
ANION GAP SERPL CALC-SCNC: 16 MMOL/L — HIGH (ref 7–14)
APTT BLD: 31.8 SEC — SIGNIFICANT CHANGE UP (ref 27–39.2)
AST SERPL-CCNC: 111 U/L — HIGH (ref 0–41)
AST SERPL-CCNC: 162 U/L — HIGH (ref 0–41)
BASOPHILS # BLD AUTO: 0.02 K/UL — SIGNIFICANT CHANGE UP (ref 0–0.2)
BASOPHILS NFR BLD AUTO: 0.4 % — SIGNIFICANT CHANGE UP (ref 0–1)
BILIRUB DIRECT SERPL-MCNC: 0.5 MG/DL — HIGH (ref 0–0.3)
BILIRUB DIRECT SERPL-MCNC: 0.6 MG/DL — HIGH (ref 0–0.3)
BILIRUB INDIRECT FLD-MCNC: 0.2 MG/DL — SIGNIFICANT CHANGE UP (ref 0.2–1.2)
BILIRUB INDIRECT FLD-MCNC: 0.2 MG/DL — SIGNIFICANT CHANGE UP (ref 0.2–1.2)
BILIRUB SERPL-MCNC: 0.7 MG/DL — SIGNIFICANT CHANGE UP (ref 0.2–1.2)
BILIRUB SERPL-MCNC: 0.8 MG/DL — SIGNIFICANT CHANGE UP (ref 0.2–1.2)
BLD GP AB SCN SERPL QL: SIGNIFICANT CHANGE UP
BUN SERPL-MCNC: 12 MG/DL — SIGNIFICANT CHANGE UP (ref 10–20)
BUN SERPL-MCNC: 9 MG/DL — LOW (ref 10–20)
CALCIUM SERPL-MCNC: 8 MG/DL — LOW (ref 8.4–10.5)
CALCIUM SERPL-MCNC: 8.1 MG/DL — LOW (ref 8.4–10.5)
CHLORIDE SERPL-SCNC: 102 MMOL/L — SIGNIFICANT CHANGE UP (ref 98–110)
CHLORIDE SERPL-SCNC: 105 MMOL/L — SIGNIFICANT CHANGE UP (ref 98–110)
CO2 SERPL-SCNC: 21 MMOL/L — SIGNIFICANT CHANGE UP (ref 17–32)
CO2 SERPL-SCNC: 25 MMOL/L — SIGNIFICANT CHANGE UP (ref 17–32)
CREAT SERPL-MCNC: 0.7 MG/DL — SIGNIFICANT CHANGE UP (ref 0.7–1.5)
CREAT SERPL-MCNC: 0.7 MG/DL — SIGNIFICANT CHANGE UP (ref 0.7–1.5)
EGFR: 92 ML/MIN/1.73M2 — SIGNIFICANT CHANGE UP
EOSINOPHIL # BLD AUTO: 0.01 K/UL — SIGNIFICANT CHANGE UP (ref 0–0.7)
EOSINOPHIL NFR BLD AUTO: 0.2 % — SIGNIFICANT CHANGE UP (ref 0–8)
GLUCOSE SERPL-MCNC: 131 MG/DL — HIGH (ref 70–99)
GLUCOSE SERPL-MCNC: 86 MG/DL — SIGNIFICANT CHANGE UP (ref 70–99)
HCT VFR BLD CALC: 39.5 % — SIGNIFICANT CHANGE UP (ref 37–47)
HGB BLD-MCNC: 13.3 G/DL — SIGNIFICANT CHANGE UP (ref 12–16)
IMM GRANULOCYTES NFR BLD AUTO: 0.8 % — HIGH (ref 0.1–0.3)
INR BLD: 0.96 RATIO — SIGNIFICANT CHANGE UP (ref 0.65–1.3)
LYMPHOCYTES # BLD AUTO: 0.82 K/UL — LOW (ref 1.2–3.4)
LYMPHOCYTES # BLD AUTO: 16.9 % — LOW (ref 20.5–51.1)
MAGNESIUM SERPL-MCNC: 1.8 MG/DL — SIGNIFICANT CHANGE UP (ref 1.8–2.4)
MCHC RBC-ENTMCNC: 28.7 PG — SIGNIFICANT CHANGE UP (ref 27–31)
MCHC RBC-ENTMCNC: 33.7 G/DL — SIGNIFICANT CHANGE UP (ref 32–37)
MCV RBC AUTO: 85.3 FL — SIGNIFICANT CHANGE UP (ref 81–99)
MONOCYTES # BLD AUTO: 0.09 K/UL — LOW (ref 0.1–0.6)
MONOCYTES NFR BLD AUTO: 1.9 % — SIGNIFICANT CHANGE UP (ref 1.7–9.3)
NEUTROPHILS # BLD AUTO: 3.86 K/UL — SIGNIFICANT CHANGE UP (ref 1.4–6.5)
NEUTROPHILS NFR BLD AUTO: 79.8 % — HIGH (ref 42.2–75.2)
NRBC BLD AUTO-RTO: 0 /100 WBCS — SIGNIFICANT CHANGE UP (ref 0–0)
PHOSPHATE SERPL-MCNC: 3.4 MG/DL — SIGNIFICANT CHANGE UP (ref 2.1–4.9)
PLATELET # BLD AUTO: 241 K/UL — SIGNIFICANT CHANGE UP (ref 130–400)
PMV BLD: 10.4 FL — SIGNIFICANT CHANGE UP (ref 7.4–10.4)
POTASSIUM SERPL-MCNC: 4.2 MMOL/L — SIGNIFICANT CHANGE UP (ref 3.5–5)
POTASSIUM SERPL-MCNC: 4.3 MMOL/L — SIGNIFICANT CHANGE UP (ref 3.5–5)
POTASSIUM SERPL-SCNC: 4.2 MMOL/L — SIGNIFICANT CHANGE UP (ref 3.5–5)
POTASSIUM SERPL-SCNC: 4.3 MMOL/L — SIGNIFICANT CHANGE UP (ref 3.5–5)
PROT SERPL-MCNC: 5.3 G/DL — LOW (ref 6–8)
PROT SERPL-MCNC: 6.1 G/DL — SIGNIFICANT CHANGE UP (ref 6–8)
PROTHROM AB SERPL-ACNC: 11.3 SEC — SIGNIFICANT CHANGE UP (ref 9.95–12.87)
RBC # BLD: 4.63 M/UL — SIGNIFICANT CHANGE UP (ref 4.2–5.4)
RBC # FLD: 13.6 % — SIGNIFICANT CHANGE UP (ref 11.5–14.5)
SODIUM SERPL-SCNC: 139 MMOL/L — SIGNIFICANT CHANGE UP (ref 135–146)
SODIUM SERPL-SCNC: 140 MMOL/L — SIGNIFICANT CHANGE UP (ref 135–146)
WBC # BLD: 4.84 K/UL — SIGNIFICANT CHANGE UP (ref 4.8–10.8)
WBC # FLD AUTO: 4.84 K/UL — SIGNIFICANT CHANGE UP (ref 4.8–10.8)

## 2025-03-28 PROCEDURE — 88112 CYTOPATH CELL ENHANCE TECH: CPT | Mod: 26

## 2025-03-28 PROCEDURE — 43262 ENDO CHOLANGIOPANCREATOGRAPH: CPT | Mod: XU

## 2025-03-28 PROCEDURE — 43264 ERCP REMOVE DUCT CALCULI: CPT | Mod: XU

## 2025-03-28 PROCEDURE — 74330 X-RAY BILE/PANC ENDOSCOPY: CPT | Mod: 26

## 2025-03-28 PROCEDURE — 99232 SBSQ HOSP IP/OBS MODERATE 35: CPT | Mod: 57

## 2025-03-28 PROCEDURE — 43274 ERCP DUCT STENT PLACEMENT: CPT

## 2025-03-28 RX ORDER — INDOMETHACIN 50 MG
100 CAPSULE ORAL ONCE
Refills: 0 | Status: COMPLETED | OUTPATIENT
Start: 2025-03-28 | End: 2025-03-28

## 2025-03-28 RX ORDER — MAGNESIUM SULFATE 500 MG/ML
1 SYRINGE (ML) INJECTION ONCE
Refills: 0 | Status: COMPLETED | OUTPATIENT
Start: 2025-03-28 | End: 2025-03-29

## 2025-03-28 RX ADMIN — Medication 1 APPLICATION(S): at 05:09

## 2025-03-28 RX ADMIN — SODIUM CHLORIDE 100 MILLILITER(S): 9 INJECTION, SOLUTION INTRAVENOUS at 05:10

## 2025-03-28 RX ADMIN — Medication 100 MILLIGRAM(S): at 14:20

## 2025-03-28 RX ADMIN — Medication 650 MILLIGRAM(S): at 05:09

## 2025-03-28 RX ADMIN — CEFTRIAXONE 100 MILLIGRAM(S): 500 INJECTION, POWDER, FOR SOLUTION INTRAMUSCULAR; INTRAVENOUS at 10:25

## 2025-03-28 RX ADMIN — Medication 650 MILLIGRAM(S): at 23:13

## 2025-03-28 RX ADMIN — Medication 650 MILLIGRAM(S): at 17:42

## 2025-03-28 RX ADMIN — Medication 650 MILLIGRAM(S): at 17:12

## 2025-03-28 RX ADMIN — Medication 650 MILLIGRAM(S): at 23:43

## 2025-03-28 RX ADMIN — SODIUM CHLORIDE 100 MILLILITER(S): 9 INJECTION, SOLUTION INTRAVENOUS at 21:10

## 2025-03-28 RX ADMIN — Medication 20 MILLIEQUIVALENT(S): at 02:15

## 2025-03-28 RX ADMIN — Medication 100 MILLIGRAM(S): at 05:09

## 2025-03-28 RX ADMIN — ENOXAPARIN SODIUM 40 MILLIGRAM(S): 100 INJECTION SUBCUTANEOUS at 11:11

## 2025-03-28 RX ADMIN — Medication 650 MILLIGRAM(S): at 05:47

## 2025-03-28 RX ADMIN — Medication 100 MILLIGRAM(S): at 21:10

## 2025-03-28 NOTE — PROGRESS NOTE ADULT - ASSESSMENT
Assessment:  73y/o female w PMH covid, DVTs (2021, not on AC) and HTN that comes in with chief complaint of abdominal pain. Clinical presentation consistent with gallstone pancreatitis.    PLAN:  - f/u GI for ERCP today  - Added on for staci lopez  - preopd, consent in chart  - NPO w/ IVF

## 2025-03-29 LAB
ALBUMIN SERPL ELPH-MCNC: 3.3 G/DL — LOW (ref 3.5–5.2)
ALP SERPL-CCNC: 139 U/L — HIGH (ref 30–115)
ALT FLD-CCNC: 184 U/L — HIGH (ref 0–41)
ANION GAP SERPL CALC-SCNC: 14 MMOL/L — SIGNIFICANT CHANGE UP (ref 7–14)
AST SERPL-CCNC: 92 U/L — HIGH (ref 0–41)
BILIRUB DIRECT SERPL-MCNC: 0.4 MG/DL — HIGH (ref 0–0.3)
BILIRUB INDIRECT FLD-MCNC: 0.2 MG/DL — SIGNIFICANT CHANGE UP (ref 0.2–1.2)
BILIRUB SERPL-MCNC: 0.6 MG/DL — SIGNIFICANT CHANGE UP (ref 0.2–1.2)
BUN SERPL-MCNC: 13 MG/DL — SIGNIFICANT CHANGE UP (ref 10–20)
CALCIUM SERPL-MCNC: 8.1 MG/DL — LOW (ref 8.4–10.5)
CHLORIDE SERPL-SCNC: 103 MMOL/L — SIGNIFICANT CHANGE UP (ref 98–110)
CO2 SERPL-SCNC: 22 MMOL/L — SIGNIFICANT CHANGE UP (ref 17–32)
CREAT SERPL-MCNC: 0.8 MG/DL — SIGNIFICANT CHANGE UP (ref 0.7–1.5)
EGFR: 78 ML/MIN/1.73M2 — SIGNIFICANT CHANGE UP
EGFR: 78 ML/MIN/1.73M2 — SIGNIFICANT CHANGE UP
GLUCOSE SERPL-MCNC: 129 MG/DL — HIGH (ref 70–99)
HCT VFR BLD CALC: 39.4 % — SIGNIFICANT CHANGE UP (ref 37–47)
HGB BLD-MCNC: 13.3 G/DL — SIGNIFICANT CHANGE UP (ref 12–16)
MCHC RBC-ENTMCNC: 28.6 PG — SIGNIFICANT CHANGE UP (ref 27–31)
MCHC RBC-ENTMCNC: 33.8 G/DL — SIGNIFICANT CHANGE UP (ref 32–37)
MCV RBC AUTO: 84.7 FL — SIGNIFICANT CHANGE UP (ref 81–99)
NRBC BLD AUTO-RTO: 0 /100 WBCS — SIGNIFICANT CHANGE UP (ref 0–0)
PLATELET # BLD AUTO: 251 K/UL — SIGNIFICANT CHANGE UP (ref 130–400)
PMV BLD: 9.9 FL — SIGNIFICANT CHANGE UP (ref 7.4–10.4)
POTASSIUM SERPL-MCNC: 4.2 MMOL/L — SIGNIFICANT CHANGE UP (ref 3.5–5)
POTASSIUM SERPL-SCNC: 4.2 MMOL/L — SIGNIFICANT CHANGE UP (ref 3.5–5)
PROT SERPL-MCNC: 5.3 G/DL — LOW (ref 6–8)
RBC # BLD: 4.65 M/UL — SIGNIFICANT CHANGE UP (ref 4.2–5.4)
RBC # FLD: 13.5 % — SIGNIFICANT CHANGE UP (ref 11.5–14.5)
SODIUM SERPL-SCNC: 139 MMOL/L — SIGNIFICANT CHANGE UP (ref 135–146)
WBC # BLD: 8.87 K/UL — SIGNIFICANT CHANGE UP (ref 4.8–10.8)
WBC # FLD AUTO: 8.87 K/UL — SIGNIFICANT CHANGE UP (ref 4.8–10.8)

## 2025-03-29 PROCEDURE — 88304 TISSUE EXAM BY PATHOLOGIST: CPT | Mod: 26

## 2025-03-29 PROCEDURE — 99232 SBSQ HOSP IP/OBS MODERATE 35: CPT

## 2025-03-29 PROCEDURE — 88302 TISSUE EXAM BY PATHOLOGIST: CPT | Mod: 26

## 2025-03-29 PROCEDURE — 49593 RPR AA HRN 1ST 3-10 RDC: CPT | Mod: XS

## 2025-03-29 PROCEDURE — 47563 LAPARO CHOLECYSTECTOMY/GRAPH: CPT

## 2025-03-29 RX ORDER — MEDPURA VITAMIN A AND D 95 G/100G
1 OINTMENT TOPICAL EVERY 6 HOURS
Refills: 0 | Status: DISCONTINUED | OUTPATIENT
Start: 2025-03-29 | End: 2025-03-30

## 2025-03-29 RX ORDER — HYDROMORPHONE/SOD CHLOR,ISO/PF 2 MG/10 ML
0.5 SYRINGE (ML) INJECTION
Refills: 0 | Status: DISCONTINUED | OUTPATIENT
Start: 2025-03-29 | End: 2025-03-29

## 2025-03-29 RX ORDER — SODIUM CHLORIDE 9 G/1000ML
1000 INJECTION, SOLUTION INTRAVENOUS
Refills: 0 | Status: DISCONTINUED | OUTPATIENT
Start: 2025-03-29 | End: 2025-03-29

## 2025-03-29 RX ORDER — HYDROMORPHONE/SOD CHLOR,ISO/PF 2 MG/10 ML
1 SYRINGE (ML) INJECTION
Refills: 0 | Status: DISCONTINUED | OUTPATIENT
Start: 2025-03-29 | End: 2025-03-29

## 2025-03-29 RX ORDER — ACETAMINOPHEN 500 MG/5ML
650 LIQUID (ML) ORAL EVERY 6 HOURS
Refills: 0 | Status: DISCONTINUED | OUTPATIENT
Start: 2025-03-29 | End: 2025-03-30

## 2025-03-29 RX ORDER — ONDANSETRON HCL/PF 4 MG/2 ML
4 VIAL (ML) INJECTION ONCE
Refills: 0 | Status: DISCONTINUED | OUTPATIENT
Start: 2025-03-29 | End: 2025-03-29

## 2025-03-29 RX ORDER — ENOXAPARIN SODIUM 100 MG/ML
40 INJECTION SUBCUTANEOUS EVERY 24 HOURS
Refills: 0 | Status: DISCONTINUED | OUTPATIENT
Start: 2025-03-29 | End: 2025-03-30

## 2025-03-29 RX ORDER — OXYCODONE HYDROCHLORIDE 30 MG/1
5 TABLET ORAL EVERY 6 HOURS
Refills: 0 | Status: DISCONTINUED | OUTPATIENT
Start: 2025-03-29 | End: 2025-03-30

## 2025-03-29 RX ORDER — METRONIDAZOLE 250 MG
500 TABLET ORAL EVERY 8 HOURS
Refills: 0 | Status: DISCONTINUED | OUTPATIENT
Start: 2025-03-29 | End: 2025-03-30

## 2025-03-29 RX ORDER — ONDANSETRON HCL/PF 4 MG/2 ML
4 VIAL (ML) INJECTION EVERY 6 HOURS
Refills: 0 | Status: DISCONTINUED | OUTPATIENT
Start: 2025-03-29 | End: 2025-03-30

## 2025-03-29 RX ORDER — CEFTRIAXONE 500 MG/1
1000 INJECTION, POWDER, FOR SOLUTION INTRAMUSCULAR; INTRAVENOUS EVERY 24 HOURS
Refills: 0 | Status: DISCONTINUED | OUTPATIENT
Start: 2025-03-29 | End: 2025-03-30

## 2025-03-29 RX ORDER — WHITE PETROLATUM 1 G/G
1 OINTMENT TOPICAL EVERY 6 HOURS
Refills: 0 | Status: DISCONTINUED | OUTPATIENT
Start: 2025-03-29 | End: 2025-03-30

## 2025-03-29 RX ADMIN — ENOXAPARIN SODIUM 40 MILLIGRAM(S): 100 INJECTION SUBCUTANEOUS at 12:44

## 2025-03-29 RX ADMIN — Medication 1 APPLICATION(S): at 18:02

## 2025-03-29 RX ADMIN — Medication 100 GRAM(S): at 03:30

## 2025-03-29 RX ADMIN — CEFTRIAXONE 100 MILLIGRAM(S): 500 INJECTION, POWDER, FOR SOLUTION INTRAMUSCULAR; INTRAVENOUS at 09:32

## 2025-03-29 RX ADMIN — SODIUM CHLORIDE 100 MILLILITER(S): 9 INJECTION, SOLUTION INTRAVENOUS at 05:40

## 2025-03-29 RX ADMIN — Medication 100 MILLIGRAM(S): at 05:40

## 2025-03-29 RX ADMIN — Medication 100 MILLIGRAM(S): at 21:57

## 2025-03-29 RX ADMIN — Medication 1 APPLICATION(S): at 05:42

## 2025-03-29 RX ADMIN — Medication 650 MILLIGRAM(S): at 18:02

## 2025-03-29 RX ADMIN — Medication 100 MILLIGRAM(S): at 12:45

## 2025-03-29 RX ADMIN — OXYCODONE HYDROCHLORIDE 5 MILLIGRAM(S): 30 TABLET ORAL at 22:28

## 2025-03-29 RX ADMIN — OXYCODONE HYDROCHLORIDE 5 MILLIGRAM(S): 30 TABLET ORAL at 21:58

## 2025-03-29 RX ADMIN — Medication 650 MILLIGRAM(S): at 18:01

## 2025-03-29 RX ADMIN — Medication 650 MILLIGRAM(S): at 23:04

## 2025-03-29 NOTE — BRIEF OPERATIVE NOTE - COMMENTS
Robot-assisted cholecystectomy and fat-containing umbilical hernia repair. Comer catheter placed prior to start of surgery. Portion of umbilical hernia contents sent to pathology after critical pause. Gallbladder gangrenous, not perforated. Critical view of safety achieved. Cystic duct and artery clipped. Gallbladder extracted in specimen bag, sent to pathology after critical pause. Liver bed irrigated, hemostasis achieved. Incisions dressed with Dermabond. Patient extubated prior to leaving the OR, comer kept in place.

## 2025-03-29 NOTE — PROGRESS NOTE ADULT - ASSESSMENT
Assessment and Plan  Case of a 74 year old female patient with a hx of HTN and DVT who presented to the ED on 03/25 for evaluation of epigastric pain, nausea, and vomiting, found to have elevated liver enzymes and lipase with non specific GB edema. s/p ERCP with sphincterotomy and PD stent placement on 03/28. Scheduled for CCY on 03/29. We are following post ERCP.      Acute Calculous Cholecystitis with GS pancreatitis  S/P ERCP with PD stent placement on 03/28  No Evidence of acute cholangitis  Hepatic Steatosis  * Hemodynamically stable; last BM brown on 03/29  * Labs: WBC 4.84, Hb 13.3, Plt 241, Na 139, K 4.3, BUN 12, Cr 0.7 on 03/28; lipase 1065 on 03/26  * Liver enzymes: 3.1/132/429/500 on 03/26 -> 0.7/153/111/247 on 03/28  * INR 0.96 on 03/28  * LA 2.6 then 2 on 03/26  * RUQ US 03/26 hepatic steatosis; CBD 4mm; cholelithiasis; non specific GB edema; no ascites  * s/p ERCP (03.28.25): sphincterotomy; no stone; PD stent placed  * No previous EGD; colonoscopy last year  * No FHx of GI cancers    RECOMMENDATIONS  - Surgery evaluation appreciated: scheduled for CCY on 03/29  - Monitor for post ERCP complications: mainly pancreatitis or bleeding  - Monitor for pain: management per team  - Monitor for nausea: consider antiemetics PRN if QTc is within normal  - Trend liver enzymes: downtrending  - Recommend serial abdominal exams. In case of worsened pain or change in exam, consider CT imaging to rule out post ERCP pancreatitis  - Advance diet as tolerated once cleared by surgery post CCY   - Continue antibiotics (on IV rocephin and flagyl). Monitor MAP and keep > 65mmHg; repeat blood cultures in case of fever  - Monitor BM for melena and avoid constipation  - Trend CBC and transfuse as needed; keep active type and screen  - Recommend Protonix 40mg QD for GI Prophylaxis; avoid inessential NSAIDs  - Follow up with our [facility] to repeat ERCP and remove stent in 4 weeks (located at [address] [place] [address]. Phone Number: [phone])   - Follow up with our [facility] for fibroscan in setting of hepatic steatosis (located at [address] [place] [address], [address]. Telephone No: [phone])      Thank you for your consult.  - Please note that plan was communicated with medical team.   - Please reach GI on [phone] during weekdays till 5pm.  - Please call the GI service line after 5pm on Weekdays and anytime on Weekends: [phone].      Lorene Rust MD  PGY - 5 Gastroenterology Fellow   [facility]         Assessment and Plan  Case of a 74 year old female patient with a hx of HTN and DVT who presented to the ED on 03/25 for evaluation of epigastric pain, nausea, and vomiting, found to have elevated liver enzymes and lipase with non specific GB edema. s/p ERCP with sphincterotomy and PD stent placement on 03/28. Scheduled for CCY on 03/29. We are following post ERCP.      Acute Calculous Cholecystitis with GS pancreatitis  S/P ERCP with PD stent placement on 03/28  No Evidence of acute cholangitis  Hepatic Steatosis  * Hemodynamically stable; last BM brown on 03/29  * Labs: WBC 4.84, Hb 13.3, Plt 241, Na 139, K 4.3, BUN 12, Cr 0.7 on 03/28; lipase 1065 on 03/26  * Liver enzymes: 3.1/132/429/500 on 03/26 -> 0.7/153/111/247 on 03/28  * INR 0.96 on 03/28  * LA 2.6 then 2 on 03/26  * RUQ US 03/26 hepatic steatosis; CBD 4mm; cholelithiasis; non specific GB edema; no ascites  * s/p ERCP (03.28.25): sphincterotomy; no stone; PD stent placed  * No previous EGD; colonoscopy last year  * No FHx of GI cancers    RECOMMENDATIONS  - Surgery evaluation appreciated: s/p lap-robotic assisted CCY on 03/29  - Monitor for post ERCP complications: mainly pancreatitis or bleeding  - Monitor for pain: management per team  - Monitor for nausea: consider antiemetics PRN if QTc is within normal  - Trend liver enzymes: downtrending  - Recommend serial abdominal exams. In case of worsened pain or change in exam, consider CT imaging to rule out post ERCP pancreatitis  - Advance diet as tolerated once cleared by surgery post CCY   - Continue antibiotics (on IV rocephin and flagyl). Monitor MAP and keep > 65mmHg; repeat blood cultures in case of fever  - Monitor BM for melena and avoid constipation  - Trend CBC and transfuse as needed; keep active type and screen  - Recommend Protonix 40mg QD for GI Prophylaxis; avoid inessential NSAIDs  - Follow up with our [facility] to repeat ERCP and remove stent in 4 weeks (located at [address] [place] [address]. Phone Number: [phone])   - Follow up with our [facility] for fibroscan in setting of hepatic steatosis (located at [address] [place] [address], [address]. Telephone No: [phone])      Thank you for your consult.  - Please note that plan was communicated with medical team.   - Please reach GI on [phone] during weekdays till 5pm.  - Please call the GI service line after 5pm on Weekdays and anytime on Weekends: [phone].      Lorene Rust MD  PGY - 5 Gastroenterology Fellow   [facility]

## 2025-03-29 NOTE — BRIEF OPERATIVE NOTE - NSICDXBRIEFPROCEDURE_GEN_ALL_CORE_FT
PROCEDURES:  Laparoscopic robot-assisted cholecystectomy 29-Mar-2025 12:45:59  Page Zelaya  Repair, hernia, umbilical, with lipectomy 29-Mar-2025 12:46:58  Page Zelaya

## 2025-03-29 NOTE — PROGRESS NOTE ADULT - ASSESSMENT
Assessment:  75y/o female w PMH covid, DVTs (2021, not on AC) and HTN that comes in with chief complaint of abdominal pain. Clinical presentation consistent with gallstone pancreatitis.    PLAN:  - Added on for robotic CCY 3/29  - Preoped, consented (in chart)  - NPO w/ IVF  - F/u OR

## 2025-03-29 NOTE — CHART NOTE - NSCHARTNOTEFT_GEN_A_CORE
- Reviewed labs, will add on for ERCP if schedule late afternoon  - Keep NPO  - Continue IVF
Patient s/p ERCP  - Sphincterotomy done   - No stones- duct irrigated  - PD stent placed
s/p ERCP with removal of sludge and placement of PD stent.     Clear liquid diet today  Advance diet tomorrow as tolerated if no post ERCP complications  Monitor liver enzymes  CCY as per surgery  GI will continue to monitor. Return to floor for further management
ANESTHESIA to PACU NOTE      ____ Intubated  TV:______       Rate: ______      FiO2: ______    __x__ Patent Airway    ___x_ Full return of protective reflexes    ____ Full recovery from anesthesia / sedation to baseline status    Vitals:  HR 69  /83  RR 12  O2sat. 97%  Temp: 36.4C      Mental Status:  _x___ Awake   _x____ Alert   _____ Drowsy   _____ Sedated    Nausea/Vomiting: ____ Yes, See Post - Op Orders      _x___ No    Pain Scale (0-10): _____    Treatment: __x__ None    ____ See Post - Op/PCA Orders    Post - Operative Fluids:   ____ Oral   __x__ See Post - Op Orders    Plan:  Discharge to:   ____Home       __x___Floor      _____Critical Care    _____ Other:_________________    Comments: s/p general anesthesia with ETT. Pt was extubated post procedure to n/c at 2L/min. No anesthesia complications. Pt's condition is stable in PACU. Full report is given to PACU RN.
Surgery Post-Op Note    CC:  Pre-Op Dx: Acute biliary pancreatitis without infection or necrosis    Gallstone pancreatitis    Gallstone pancreatitis    Gallstone pancreatitis      Procedure: Laparoscopic robot-assisted cholecystectomy    Repair, hernia, umbilical, with lipectomy      Surgeon: Dr. Reynoso    Subjective: 75 y/o F w/ pmh covid, DVT (2021, no on AC), and HTN, is s/p robotic ccy for gallstone pancreatitis and umbilical hernia repair    Vital Signs Last 24 Hrs  T(C): 36.8 (30 Mar 2025 00:54), Max: 36.8 (30 Mar 2025 00:54)  T(F): 98.2 (30 Mar 2025 00:54), Max: 98.2 (30 Mar 2025 00:54)  HR: 68 (30 Mar 2025 00:54) (57 - 73)  BP: 109/63 (30 Mar 2025 00:54) (109/63 - 134/77)  BP(mean): 85 (29 Mar 2025 20:30) (83 - 90)  RR: 18 (30 Mar 2025 00:54) (11 - 21)  SpO2: 95% (30 Mar 2025 00:54) (95% - 98%)    Parameters below as of 30 Mar 2025 00:54  Patient On (Oxygen Delivery Method): room air        Physical Exam:  General: NAD, resting comfortably in bed  Pulmonary: Nonlabored breathing, no respiratory distress  Cardiovascular: NSR  Abdominal: soft, NT/ND, 4 incisions dermabonded  Extremities: WWP, normal strength  Skin: no hematoma, rash, ecchymosis  Neuro: A/O x 3, CNs II-XII grossly intact, normal motor/sensation, no focal deficits      LABS:                        13.3   8.87  )-----------( 251      ( 29 Mar 2025 12:45 )             39.4     03-29    139  |  103  |  13  ----------------------------<  129[H]  4.2   |  22  |  0.8    Ca    8.1[L]      29 Mar 2025 12:45  Phos  3.4     03-28  Mg     1.8     03-28    TPro  5.3[L]  /  Alb  3.3[L]  /  TBili  0.6  /  DBili  0.4[H]  /  AST  92[H]  /  ALT  184[H]  /  AlkPhos  139[H]  03-29    PT/INR - ( 28 Mar 2025 20:37 )   PT: 11.30 sec;   INR: 0.96 ratio         PTT - ( 28 Mar 2025 20:37 )  PTT:31.8 sec  CAPILLARY BLOOD GLUCOSE        LIVER FUNCTIONS - ( 29 Mar 2025 12:45 )  Alb: 3.3 g/dL / Pro: 5.3 g/dL / ALK PHOS: 139 U/L / ALT: 184 U/L / AST: 92 U/L / GGT: x           Urinalysis Basic - ( 29 Mar 2025 12:45 )    Color: x / Appearance: x / SG: x / pH: x  Gluc: 129 mg/dL / Ketone: x  / Bili: x / Urobili: x   Blood: x / Protein: x / Nitrite: x   Leuk Esterase: x / RBC: x / WBC x   Sq Epi: x / Non Sq Epi: x / Bacteria: x        Radiology and Additional Studies:    Assessment:75 y/o F w/ pmh covid, DVT (2021, no on AC), and HTN, is s/p robotic ccy for gallstone pancreatitis and umbilical hernia repair    Plan:  monitor bowel function  CLD, ADAT  comer removal in AM  c/w ceftriaxone and flagyl, possible abx outpt  labs reviewed

## 2025-03-29 NOTE — BRIEF OPERATIVE NOTE - NSICDXBRIEFPOSTOP_GEN_ALL_CORE_FT
POST-OP DIAGNOSIS:  Gallstone pancreatitis 29-Mar-2025 12:47:38 S/p ERCP with sphincterotomy, PD stent placement Page Zelaya

## 2025-03-29 NOTE — BRIEF OPERATIVE NOTE - NSICDXBRIEFPREOP_GEN_ALL_CORE_FT
PRE-OP DIAGNOSIS:  Gallstone pancreatitis 29-Mar-2025 12:47:20 S/p ERCP with sphincterotomy, PD stent placement Page Zelaya

## 2025-03-29 NOTE — PRE-ANESTHESIA EVALUATION ADULT - NSANTHOSAYNRD_GEN_A_CORE
No. ANTWAN screening performed.  STOP BANG Legend: 0-2 = LOW Risk; 3-4 = INTERMEDIATE Risk; 5-8 = HIGH Risk
No. ANTWAN screening performed.  STOP BANG Legend: 0-2 = LOW Risk; 3-4 = INTERMEDIATE Risk; 5-8 = HIGH Risk

## 2025-03-30 VITALS
OXYGEN SATURATION: 98 % | TEMPERATURE: 98 F | DIASTOLIC BLOOD PRESSURE: 65 MMHG | HEART RATE: 70 BPM | SYSTOLIC BLOOD PRESSURE: 127 MMHG | RESPIRATION RATE: 18 BRPM

## 2025-03-30 PROCEDURE — 47563 LAPARO CHOLECYSTECTOMY/GRAPH: CPT

## 2025-03-30 PROCEDURE — 49593 RPR AA HRN 1ST 3-10 RDC: CPT | Mod: XS

## 2025-03-30 PROCEDURE — 99231 SBSQ HOSP IP/OBS SF/LOW 25: CPT | Mod: 25,24

## 2025-03-30 RX ORDER — CLINDAMYCIN PHOSPHATE 150 MG/ML
1 VIAL (ML) INJECTION
Qty: 9 | Refills: 0
Start: 2025-03-30 | End: 2025-04-01

## 2025-03-30 RX ORDER — ACETAMINOPHEN 500 MG/5ML
2 LIQUID (ML) ORAL
Qty: 0 | Refills: 0 | DISCHARGE
Start: 2025-03-30

## 2025-03-30 RX ORDER — IBUPROFEN 200 MG
1 TABLET ORAL
Qty: 0 | Refills: 0 | DISCHARGE

## 2025-03-30 RX ORDER — OXYCODONE HYDROCHLORIDE 30 MG/1
1 TABLET ORAL
Qty: 5 | Refills: 0
Start: 2025-03-30

## 2025-03-30 RX ADMIN — Medication 650 MILLIGRAM(S): at 06:14

## 2025-03-30 RX ADMIN — Medication 1 APPLICATION(S): at 06:15

## 2025-03-30 RX ADMIN — Medication 650 MILLIGRAM(S): at 12:16

## 2025-03-30 RX ADMIN — Medication 650 MILLIGRAM(S): at 11:46

## 2025-03-30 RX ADMIN — ENOXAPARIN SODIUM 40 MILLIGRAM(S): 100 INJECTION SUBCUTANEOUS at 11:46

## 2025-03-30 RX ADMIN — Medication 100 MILLIGRAM(S): at 06:13

## 2025-03-30 RX ADMIN — CEFTRIAXONE 100 MILLIGRAM(S): 500 INJECTION, POWDER, FOR SOLUTION INTRAMUSCULAR; INTRAVENOUS at 11:46

## 2025-03-30 RX ADMIN — Medication 100 MILLIGRAM(S): at 14:34

## 2025-03-30 NOTE — DISCHARGE NOTE PROVIDER - HOSPITAL COURSE
0145: Patient complaining of lower abdominal pain/suprapubic pain. Patient is moaning in pain at this time. Patient was medicated.       Armando Zarate RN  09/14/23 7133 75y/o female w PMH covid, DVTs (2021, not on AC) and HTN that comes in with chief complaint of abdominal pain. Pt reports pain is localized in her epigastric area w a moderate intensity that has been constant since it began. Patient reports some nausea and 1 episode of non bloody emesis. Pt denies any fevers, chills or previous similar episodes. The patient was found to have gallstone pancreatitis on work up and admitted to the surgery service for operative intervention. The patient had uptrending LFTs and was evaluated by GI and taken for ERCP, no cbd stones were found, but a PD stent was placed. The patient was taken for a cholecystectomy on Robot-assisted cholecystectomy and fat-containing umbilical hernia repair. Comer catheter placed prior to start of surgery. Portion of umbilical hernia contents sent to pathology after critical pause. Gallbladder gangrenous, not perforated. Critical view of safety achieved. Cystic duct and artery clipped. Gallbladder extracted in specimen bag, sent to pathology after critical pause. Liver bed irrigated, hemostasis achieved. Incisions dressed with Dermabond. Patient extubated prior to leaving the OR, comer kept in place. Post operatively the patient tolerated diet, comer was removed and the patient voided. The patient will be discharged to home with an additional 3 days of antibiotics. The patient will follow up with GI and with surgery post operatively.

## 2025-03-30 NOTE — DISCHARGE NOTE PROVIDER - NSDCCPCAREPLAN_GEN_ALL_CORE_FT
PRINCIPAL DISCHARGE DIAGNOSIS  Diagnosis: Gallstone pancreatitis  Assessment and Plan of Treatment: S/P ERCP with PD stent placement- please follow up with GI in 4-6 weeks for removal, call to schedule the appointment.  S/P robotic cholecystectomy, umbilical hernia repair.   Continue regular diet.  Dressings : Dermabond will dissolve on its own. OK to shower normally. Leave dermabond in place, it will dissolve on its own in about 1 week, avoid scrubbing the area in the shower.  Pain : Take ibuprofen, tylenol around the clock (every 8 hours) for at least three days. Take oxycodone 5mg as needed for breakthrough pain. Please be aware, the medication can cause drowsiness, so reserve for night time use.   Antibiotics : Complete 3 day course of clindamycin as prescribed.   Activity : Please avoid heavy lifting (anything over 10 pounds) for at least 6 weeks.   Follow up : Call to schedule a follow up appointment in 2 weeks, [phone]

## 2025-03-30 NOTE — DISCHARGE NOTE PROVIDER - NSDCFUADDAPPT_GEN_ALL_CORE_FT
Follow up with our [facility] to repeat ERCP and remove stent in 4 weeks (located at [address] [place] [address]. Phone Number: [phone])   - Follow up with our [facility] for fibroscan in setting of hepatic steatosis (located at [address] [place], [place], [address]. Telephone No: [phone])

## 2025-03-30 NOTE — DISCHARGE NOTE NURSING/CASE MANAGEMENT/SOCIAL WORK - NSDCFUADDAPPT_GEN_ALL_CORE_FT
Follow up with our [facility] to repeat ERCP and remove stent in 4 weeks (located at [address] [place] [address]. Phone Number: [phone])   - Follow up with our [facility] for fibroscan in setting of hepatic steatosis (located at [address] [place], [address]. Telephone No: [phone])

## 2025-03-30 NOTE — DISCHARGE NOTE NURSING/CASE MANAGEMENT/SOCIAL WORK - FINANCIAL ASSISTANCE
[facility] provides services at a reduced cost to those who are determined to be eligible through [facility]’s financial assistance program. Information regarding [facility]’s financial assistance program can be found by going to [email or web address] or by calling [phone].

## 2025-03-30 NOTE — DISCHARGE NOTE NURSING/CASE MANAGEMENT/SOCIAL WORK - PATIENT PORTAL LINK FT
You can access the FollowMyHealth Patient Portal offered by  by registering at the following website: [email or web address]. By joining [employer]’s FollowMyHealth portal, you will also be able to view your health information using other applications (apps) compatible with our system.

## 2025-03-30 NOTE — PROGRESS NOTE ADULT - SUBJECTIVE AND OBJECTIVE BOX
Gastroenterology Follow Up Note      Location: [address] ([address])  Patient Name: DEAN CORREIA  Age: 74y  Gender: Female      Chief Complaint  Patient is a 74y old Female who presents with a chief complaint of   Primary diagnosis of Acute biliary pancreatitis without infection or necrosis        Reason for Consult  GS pancreatitis      Progress Note  This morning patient was seen and examined at bedside.    Today is hospital day 3d.  Patient is doing fine. No acute events overnight.   Patient denies nausea or vomiting.   Patient denies any abdominal pain.  Last bowel movement was soft and brown on 03/29.      Vital Signs in the last 24 hours   Vitals Summary T(C): 36.6 (03-29-25 @ 13:10), Max: 36.6 (03-28-25 @ 20:02)  HR: 57 (03-29-25 @ 13:10) (54 - 73)  BP: 133/67 (03-29-25 @ 13:10) (117/60 - 144/72)  RR: 15 (03-29-25 @ 13:10) (11 - 21)  SpO2: 98% (03-29-25 @ 13:10) (94% - 98%)  Vent Data   Intake/ Output   03-29-25 @ 07:01  -  03-29-25 @ 14:32  --------------------------------------------------------  IN: 200 mL / OUT: 50 mL / NET: 150 mL      Measurements Height (cm): 154.9 (03-29-25 @ 09:20)  Weight (kg): 65 (03-29-25 @ 09:20)  BMI (kg/m2): 27.1 (03-29-25 @ 09:20)  BSA (m2): 1.64 (03-29-25 @ 09:20)      Physical Exam  * General Appearance: Alert, cooperative, interactive, oriented to time, place, and person, in no acute distress  * Neck: Supple, symmetrical, trachea midline, no adenopathy   * Lungs: Good bilateral air entry, normal breath sounds (Clear to auscultation bilaterally  * Heart: Regular Rate and Rhythm, normal S1 and S2, no audible murmur, rub, or gallop  * Abdomen: Symmetric, non-distended, soft, non-tender, bowel sounds active all four quadrants, no masses, no organomegaly (no hepatosplenomegaly)      Investigations   Laboratory Workup      - CBC:                        13.3   8.87  )-----------( 251      ( 29 Mar 2025 12:45 )             39.4       - Hgb Trend:  13.3  03-29-25 @ 12:45  13.3  03-28-25 @ 20:37  12.3  03-27-25 @ 20:31  12.4  03-26-25 @ 21:09          - Chemistry:  03-29    139  |  103  |  13  ----------------------------<  129[H]  4.2   |  22  |  0.8    Ca    8.1[L]      29 Mar 2025 12:45  Phos  3.4     03-28  Mg     1.8     03-28    TPro  5.3[L]  /  Alb  3.3[L]  /  TBili  0.6  /  DBili  0.4[H]  /  AST  92[H]  /  ALT  184[H]  /  AlkPhos  139[H]  03-29    Liver panel trend:  TBili 0.6   /   AST 92   /      /   AlkP 139   /   Tptn 5.3   /   Alb 3.3    /   DBili 0.4      03-29  TBili 0.7   /      /      /   AlkP 153   /   Tptn 6.1   /   Alb 3.3    /   DBili 0.5      03-28  TBili 0.8   /      /      /   AlkP 133   /   Tptn 5.3   /   Alb 3.3    /   DBili 0.6      03-28  TBili 1.1   /      /      /   AlkP 142   /   Tptn 5.5   /   Alb 3.2    /   DBili 0.7      03-27  TBili 3.1   /      /      /   AlkP 158   /   Tptn 6.0   /   Alb 3.5    /   DBili 2.1      03-27  TBili 3.1   /      /      /   AlkP 132   /   Tptn 5.7   /   Alb 3.3    /   DBili 2.1      03-26  TBili 2.1   /      /      /   AlkP 146   /   Tptn 5.8   /   Alb 3.8    /   DBili 1.4      03-26  TBili --   /   AST --   /   ALT --   /   AlkP --   /   Tptn --   /   Alb --    /   DBili 0.8      03-26  TBili 1.2   /      /      /   AlkP 158   /   Tptn 6.9   /   Alb 4.3    /   DBili --      03-26      - Coagulation Studies:  PT/INR - ( 28 Mar 2025 20:37 )   PT: 11.30 sec;   INR: 0.96 ratio         PTT - ( 28 Mar 2025 20:37 )  PTT:31.8 sec    - ABG:      - Cardiac Markers:        Microbiological Workup  Urinalysis Basic - ( 29 Mar 2025 12:45 )    Color: x / Appearance: x / SG: x / pH: x  Gluc: 129 mg/dL / Ketone: x  / Bili: x / Urobili: x   Blood: x / Protein: x / Nitrite: x   Leuk Esterase: x / RBC: x / WBC x   Sq Epi: x / Non Sq Epi: x / Bacteria: x          Radiological Workup            Current Medications  Standing Medications  acetaminophen     Tablet .. 650 milliGRAM(s) Oral every 6 hours  cefTRIAXone   IVPB 1000 milliGRAM(s) IV Intermittent every 24 hours  chlorhexidine 2% Cloths 1 Application(s) Topical <User Schedule>  enoxaparin Injectable 40 milliGRAM(s) SubCutaneous every 24 hours  lactated ringers. 1000 milliLiter(s) (100 mL/Hr) IV Continuous <Continuous>  metroNIDAZOLE  IVPB 500 milliGRAM(s) IV Intermittent every 8 hours    PRN Medications  HYDROmorphone  Injectable 0.5 milliGRAM(s) IV Push every 10 minutes PRN Moderate Pain (4 - 6)  HYDROmorphone  Injectable 1 milliGRAM(s) IV Push every 10 minutes PRN Severe Pain (7 - 10)  ondansetron Injectable 4 milliGRAM(s) IV Push every 6 hours PRN Nausea and/or Vomiting  ondansetron Injectable 4 milliGRAM(s) IV Push once PRN Nausea and/or Vomiting  oxyCODONE    IR 5 milliGRAM(s) Oral every 6 hours PRN Moderate Pain (4 - 6)    Singles Doses Administered  (ADM OVERRIDE) 1 each &lt;see task&gt; GiveOnce  (ADM OVERRIDE) 1 each &lt;see task&gt; GiveOnce  (ADM OVERRIDE) 1 each &lt;see task&gt; GiveOnce  (ADM OVERRIDE) 1 each &lt;see task&gt; GiveOnce  (ADM OVERRIDE) 1 each &lt;see task&gt; GiveOnce  (ADM OVERRIDE) 1 each &lt;see task&gt; GiveOnce  (ADM OVERRIDE) 1 each &lt;see task&gt; GiveOnce  (ADM OVERRIDE) 1 each &lt;see task&gt; GiveOnce  (ADM OVERRIDE) 1 each &lt;see task&gt; GiveOnce  (ADM OVERRIDE) 1 each &lt;see task&gt; GiveOnce  (ADM OVERRIDE) 1 each &lt;see task&gt; GiveOnce  (ADM OVERRIDE) 1 each &lt;see task&gt; GiveOnce  (ADM OVERRIDE) 1 each &lt;see task&gt; GiveOnce  cefTRIAXone   IVPB 1000 milliGRAM(s) IV Intermittent once  ciprofloxacin   IVPB 400 milliGRAM(s) IV Intermittent once  famotidine Injectable 20 milliGRAM(s) IV Push once  indomethacin Suppository 100 milliGRAM(s) Rectal once  lactated ringers Bolus 1000 milliLiter(s) IV Bolus once  lactated ringers Bolus 500 milliLiter(s) IV Bolus once  magnesium sulfate  IVPB 1 Gram(s) IV Intermittent once  magnesium sulfate  IVPB 2 Gram(s) IV Intermittent once  magnesium sulfate  IVPB 2 Gram(s) IV Intermittent once  metroNIDAZOLE  IVPB 500 milliGRAM(s) IV Intermittent once  metroNIDAZOLE  IVPB 500 milliGRAM(s) IV Intermittent once  ondansetron Injectable 4 milliGRAM(s) IV Push once  potassium chloride    Tablet ER 40 milliEquivalent(s) Oral once  potassium chloride    Tablet ER 20 milliEquivalent(s) Oral every 2 hours  potassium chloride  20 mEq/100 mL IVPB 20 milliEquivalent(s) IV Intermittent every 2 hours      
 GENERAL SURGERY PROGRESS NOTE     Patient: DEAN CORREIA , 74y ([birth year])Female   MRN: [id number]  Location: [address]  Visit: 03-26-25 Inpatient  Date: 03-28-25 @ 00:23        Admitted :03-26-25 (2d)  LOS: 2d    Procedure/Dx/Injuries: Gallstone pancreatitis    Gallstone pancreatitis         Events of past 24 hours: Patient unable to go to ERCP in the day, supposed to go 3/28. Denies nausea or vomiting. Passing gas,no bowel movements.   >>> <<<>>> <<<>>> <<<>>> <<<>>> <<<>>> <<<>>> <<<>>> <<<>>> <<<>>> <<<    Vitals:   T(F): 97.6 (03-28-25 @ 00:02), Max: 98.2 (03-27-25 @ 04:29)  HR: 64 (03-28-25 @ 00:02)  BP: 123/74 (03-28-25 @ 00:02)  RR: 18 (03-28-25 @ 00:02)  SpO2: 96% (03-28-25 @ 00:02)      PHYSICAL EXAM:  GENERAL: No acute distress, well-developed  CHEST/LUNG: b/l equal chest rise. No respiratory distress.  HEART: Regular rate and rhythm.  ABDOMEN: Soft, mild epigastric tenderness to palpation, mild distention, no rebound, no guarding  >>> <<<>>> <<<>>> <<<>>> <<<>>> <<<>>> <<<>>> <<<>>> <<<>>> <<<>>> <<<   Is & Os:   Diet, NPO after Midnight:      NPO Start Date: 27-Mar-2025,   NPO Start Time: 23:59  Diet, NPO after Midnight:      NPO Start Date: 26-Mar-2025,   NPO Start Time: 23:59    Fluids:     03-26-25 @ 07:01  -  03-27-25 @ 07:00  --------------------------------------------------------  IN:    IV PiggyBack: 50 mL    IV PiggyBack: 300 mL    IV PiggyBack: 200 mL    Lactated Ringers: 1300 mL  Total IN: 1850 mL    OUT:    Voided (mL): 600 mL  Total OUT: 600 mL    Total NET: 1250 mL      >>> <<<>>> <<<>>> <<<>>> <<<>>> <<<>>> <<<>>> <<<>>> <<<>>> <<<>>> <<<    MEDICATIONS  (STANDING):  acetaminophen     Tablet .. 650 milliGRAM(s) Oral every 6 hours  cefTRIAXone   IVPB      cefTRIAXone   IVPB 1000 milliGRAM(s) IV Intermittent every 24 hours  chlorhexidine 2% Cloths 1 Application(s) Topical <User Schedule>  enoxaparin Injectable 40 milliGRAM(s) SubCutaneous every 24 hours  lactated ringers. 1000 milliLiter(s) (100 mL/Hr) IV Continuous <Continuous>  metroNIDAZOLE  IVPB 500 milliGRAM(s) IV Intermittent every 8 hours  potassium chloride    Tablet ER 20 milliEquivalent(s) Oral every 2 hours    MEDICATIONS  (PRN):  diphenhydrAMINE Injectable 25 milliGRAM(s) IV Push once PRN Allergy symptoms  oxyCODONE    IR 5 milliGRAM(s) Oral every 6 hours PRN Moderate Pain (4 - 6)      DVT PROPHYLAXIS: enoxaparin Injectable 40 milliGRAM(s) SubCutaneous every 24 hours    GI PROPHYLAXIS:   ANTICOAGULATION:   ANTIBIOTICS:  cefTRIAXone   IVPB    cefTRIAXone   IVPB 1000 milliGRAM(s)  metroNIDAZOLE  IVPB 500 milliGRAM(s)      >>> <<<>>> <<<>>> <<<>>> <<<>>> <<<>>> <<<>>> <<<>>> <<<>>> <<<>>> <<<        LAB/STUDIES:  Labs:  CAPILLARY BLOOD GLUCOSE                              12.3   5.59  )-----------( 213      ( 27 Mar 2025 20:31 )             36.1       Auto Immature Granulocyte %: 0.5 % (03-27-25 @ 20:31)    03-27    142  |  104  |  10  ----------------------------<  83  3.5   |  26  |  0.9      Calcium: 8.3 mg/dL (03-27-25 @ 20:31)      LFTs:             5.5  | 1.1  | 213      ------------------[142     ( 27 Mar 2025 20:31 )  3.2  | 0.7  | 329         Lipase:x      Amylase:x         Lactate, Blood: 2.0 mmol/L (03-26-25 @ 11:02)  Lactate, Blood: 2.6 mmol/L (03-26-25 @ 01:00)      Coags:     10.30  ----< 0.88    ( 26 Mar 2025 04:05 )     24.1                Urinalysis Basic - ( 27 Mar 2025 20:31 )    Color: x / Appearance: x / SG: x / pH: x  Gluc: 83 mg/dL / Ketone: x  / Bili: x / Urobili: x   Blood: x / Protein: x / Nitrite: x   Leuk Esterase: x / RBC: x / WBC x   Sq Epi: x / Non Sq Epi: x / Bacteria: x        Culture - Blood (collected 26 Mar 2025 04:40)  Source: Blood Blood-Peripheral  Preliminary Report (27 Mar 2025 14:01):    No growth at 24 hours    Culture - Blood (collected 26 Mar 2025 04:40)  Source: Blood Blood-Peripheral  Preliminary Report (27 Mar 2025 14:01):    No growth at 24 hours        
GENERAL SURGERY PROGRESS NOTE     CORREIA DEAN  [address] [place] [address] day : 2d      Surgical Attending: Dr. Chavarria  24 hour events: Uptrending LFTs, pending AM labs. Repleted K+, pending AM labs. Booked for today for robotic lon, possible laparoscopic, possible intraoperative cholangiogram. Possible GI intervention today for uptrending LFTs. Mg repleted.    PHYSICAL EXAM:  GENERAL: No acute distress, well-developed  CHEST/LUNG: b/l equal chest rise. No respiratory distress.  HEART: Regular rate and rhythm.  ABDOMEN: Soft, mild epigastric tenderness to palpation, mild distention, no rebound, no guarding      T(F): 99 (03-27-25 @ 00:00), Max: 99 (03-26-25 @ 05:38)  HR: 73 (03-27-25 @ 00:00) (67 - 92)  BP: 123/76 (03-27-25 @ 00:00) (90/54 - 123/76)  ABP: --  ABP(mean): --  RR: 18 (03-27-25 @ 00:00) (18 - 19)  SpO2: 94% (03-27-25 @ 00:00) (94% - 96%)    IN'S / OUT's:      MEDICATIONS  (STANDING):  acetaminophen     Tablet .. 650 milliGRAM(s) Oral every 6 hours  cefTRIAXone   IVPB      cefTRIAXone   IVPB 1000 milliGRAM(s) IV Intermittent every 24 hours  enoxaparin Injectable 40 milliGRAM(s) SubCutaneous every 24 hours  lactated ringers. 1000 milliLiter(s) (100 mL/Hr) IV Continuous <Continuous>  metroNIDAZOLE  IVPB 500 milliGRAM(s) IV Intermittent every 8 hours  potassium chloride  20 mEq/100 mL IVPB 20 milliEquivalent(s) IV Intermittent every 2 hours    MEDICATIONS  (PRN):  diphenhydrAMINE Injectable 25 milliGRAM(s) IV Push once PRN Allergy symptoms  oxyCODONE    IR 5 milliGRAM(s) Oral every 6 hours PRN Moderate Pain (4 - 6)      LABS  Labs:  CAPILLARY BLOOD GLUCOSE                              12.4   9.00  )-----------( 218      ( 26 Mar 2025 21:09 )             36.4       Auto Immature Granulocyte %: 0.8 % (03-26-25 @ 21:09)  Auto Immature Granulocyte %: 0.7 % (03-26-25 @ 11:02)  Auto Immature Granulocyte %: 0.5 % (03-26-25 @ 01:00)    03-26    140  |  100  |  12  ----------------------------<  92  3.2[L]   |  29  |  1.0      Calcium: 8.5 mg/dL (03-26-25 @ 21:09)      LFTs:             5.7  | 3.1  | 429      ------------------[132     ( 26 Mar 2025 21:09 )  3.3  | 2.1  | 500         Lipase:x      Amylase:x         Lactate, Blood: 2.0 mmol/L (03-26-25 @ 11:02)  Lactate, Blood: 2.6 mmol/L (03-26-25 @ 01:00)      Coags:     10.30  ----< 0.88    ( 26 Mar 2025 04:05 )     24.1                Urinalysis Basic - ( 26 Mar 2025 21:09 )    Color: x / Appearance: x / SG: x / pH: x  Gluc: 92 mg/dL / Ketone: x  / Bili: x / Urobili: x   Blood: x / Protein: x / Nitrite: x   Leuk Esterase: x / RBC: x / WBC x   Sq Epi: x / Non Sq Epi: x / Bacteria: x            RADIOLOGY & ADDITIONAL TESTS:  
GENERAL SURGERY PROGRESS NOTE     CORREIA DEAN  [address] [place] [address] day :5d    POD: 1d  Procedure: Laparoscopic robot-assisted cholecystectomy    Repair, hernia, umbilical, with lipectomy      Surgical Attending: Nathan Coffman  24 hour events: s/p OR, doing well, pain improved, +CLD, ambulating, voiding, incisions C/d/I, dermabonded.     Physical Exam:  General: NAD, resting comfortably in bed  Pulmonary: Nonlabored breathing, no respiratory distress  Cardiovascular: NSR  Abdominal: soft, NT/ND, 4 incisions dermabonded  Extremities: WWP, normal strength  Skin: no hematoma, rash, ecchymosis  Neuro: A/O x 3, CNs II-XII grossly intact, normal motor/sensation, no focal deficits        T(F): 98.2 (03-30-25 @ 00:54), Max: 98.2 (03-30-25 @ 00:54)  HR: 68 (03-30-25 @ 00:54) (57 - 73)  BP: 109/63 (03-30-25 @ 00:54) (109/63 - 134/77)  ABP: --  ABP(mean): --  RR: 18 (03-30-25 @ 00:54) (11 - 21)  SpO2: 95% (03-30-25 @ 00:54) (95% - 98%)    IN'S / OUT's:    03-29-25 @ 07:01  -  03-30-25 @ 02:41  --------------------------------------------------------  IN:    IV PiggyBack: 100 mL    Lactated Ringers: 100 mL    Lactated Ringers: 400 mL  Total IN: 600 mL    OUT:    Indwelling Catheter - Urethral (mL): 850 mL  Total OUT: 850 mL    Total NET: -250 mL          MEDICATIONS  (STANDING):  acetaminophen     Tablet .. 650 milliGRAM(s) Oral every 6 hours  cefTRIAXone   IVPB 1000 milliGRAM(s) IV Intermittent every 24 hours  chlorhexidine 2% Cloths 1 Application(s) Topical <User Schedule>  enoxaparin Injectable 40 milliGRAM(s) SubCutaneous every 24 hours  metroNIDAZOLE  IVPB 500 milliGRAM(s) IV Intermittent every 8 hours    MEDICATIONS  (PRN):  ondansetron Injectable 4 milliGRAM(s) IV Push every 6 hours PRN Nausea and/or Vomiting  oxyCODONE    IR 5 milliGRAM(s) Oral every 6 hours PRN Moderate Pain (4 - 6)  petrolatum white Ointment 1 Application(s) Topical every 6 hours PRN For dry lips  vitamin A & D Ointment 1 Application(s) Topical every 6 hours PRN dry lips      LABS  Labs:  CAPILLARY BLOOD GLUCOSE                              13.3   8.87  )-----------( 251      ( 29 Mar 2025 12:45 )             39.4         03-29    139  |  103  |  13  ----------------------------<  129[H]  4.2   |  22  |  0.8      Calcium: 8.1 mg/dL (03-29-25 @ 12:45)      LFTs:             5.3  | 0.6  | 92       ------------------[139     ( 29 Mar 2025 12:45 )  3.3  | 0.4  | 184         Lipase:x      Amylase:x             Coags:     11.30  ----< 0.96    ( 28 Mar 2025 20:37 )     31.8                Urinalysis Basic - ( 29 Mar 2025 12:45 )    Color: x / Appearance: x / SG: x / pH: x  Gluc: 129 mg/dL / Ketone: x  / Bili: x / Urobili: x   Blood: x / Protein: x / Nitrite: x   Leuk Esterase: x / RBC: x / WBC x   Sq Epi: x / Non Sq Epi: x / Bacteria: x            RADIOLOGY & ADDITIONAL TESTS:  
SURGERY DAILY PROGRESS NOTE    24 HR EVENTS: Patient added on for OR today, likely this morning. No available OR time yesterday. Patient NPO since midnight, preoped/consented. S/p ERCP yesterday (3/28) with sphincterotomy and PD stent placement. patient with no complaints at bedside, abdominal pain currently well controlled, no nausea or vomiting overnight. Patient saturating well on RA, 1 episode of HR 54, otherwise vitals WNL. Labs stable.    OBJECTIVE:  Vital Signs Last 24 Hrs  T(C): 36.3 (28 Mar 2025 23:58), Max: 36.9 (28 Mar 2025 04:49)  T(F): 97.4 (28 Mar 2025 23:58), Max: 98.5 (28 Mar 2025 04:49)  HR: 54 (28 Mar 2025 23:58) (54 - 73)  BP: 144/72 (28 Mar 2025 23:58) (116/69 - 147/78)  BP(mean): 96 (28 Mar 2025 23:58) (67 - 96)  RR: 18 (28 Mar 2025 23:58) (14 - 18)  SpO2: 96% (28 Mar 2025 23:58) (94% - 97%)    Parameters below as of 28 Mar 2025 15:10  Patient On (Oxygen Delivery Method): room air        I&O's Summary    27 Mar 2025 07:01  -  28 Mar 2025 07:00  --------------------------------------------------------  IN: 1500 mL / OUT: 500 mL / NET: 1000 mL        PHYSICAL EXAM:  GENERAL: No acute distress, well-developed  CHEST/LUNG: b/l equal chest rise. No respiratory distress.  HEART: Regular rate and rhythm.  ABDOMEN: Soft, no abdominal tenderness to palpation, mild distention, no rebound, no guarding    LABS:                        13.3   4.84  )-----------( 241      ( 28 Mar 2025 20:37 )             39.5     03-28    139  |  102  |  12  ----------------------------<  131[H]  4.3   |  21  |  0.7    Ca    8.0[L]      28 Mar 2025 20:37  Phos  3.4     03-28  Mg     1.8     03-28    TPro  6.1  /  Alb  3.3[L]  /  TBili  0.7  /  DBili  0.5[H]  /  AST  111[H]  /  ALT  247[H]  /  AlkPhos  153[H]  03-28    PT/INR - ( 28 Mar 2025 20:37 )   PT: 11.30 sec;   INR: 0.96 ratio         PTT - ( 28 Mar 2025 20:37 )  PTT:31.8 sec  Urinalysis Basic - ( 28 Mar 2025 20:37 )    Color: x / Appearance: x / SG: x / pH: x  Gluc: 131 mg/dL / Ketone: x  / Bili: x / Urobili: x   Blood: x / Protein: x / Nitrite: x   Leuk Esterase: x / RBC: x / WBC x   Sq Epi: x / Non Sq Epi: x / Bacteria: x        RADIOLOGY & ADDITIONAL STUDIES:

## 2025-03-30 NOTE — PROGRESS NOTE ADULT - ASSESSMENT
73 y/o F w/ pmh covid, DVT (2021, no on AC), and HTN, is s/p robotic ccy for gallstone pancreatitis and umbilical hernia repair    Plan:  monitor bowel function  CLD, ADAT  comer removal in AM  c/w ceftriaxone and flagyl, possible abx outpt  labs reviewed

## 2025-03-30 NOTE — DISCHARGE NOTE PROVIDER - CARE PROVIDER_API CALL
Nathan Coffman J  Surgery  [address] [place] [address] [address] [place] [address]  Phone: [phone]  Fax: [phone]  Follow Up Time: 2 weeks

## 2025-03-30 NOTE — DISCHARGE NOTE PROVIDER - NSDCMRMEDTOKEN_GEN_ALL_CORE_FT
acetaminophen 325 mg oral tablet: 2 tab(s) orally every 6 hours for at least 3 days, as needed thereafter  Cleocin HCl 300 mg oral capsule: 1 cap(s) orally every 8 hours  ibuprofen 400 mg oral tablet: 1 tab(s) orally every 8 hours for at least 3 days as needed thereafter  oxyCODONE 5 mg oral tablet: 1 tab(s) orally every 6 hours as needed for Moderate Pain (4 - 6) MDD: 4

## 2025-03-30 NOTE — DISCHARGE NOTE PROVIDER - ATTENDING DISCHARGE PHYSICAL EXAMINATION:
I independently performed a medically appropriate exam. The exam was notable for abdominal incisional tenderness but port sites otherwise healing well.

## 2025-03-30 NOTE — PROGRESS NOTE ADULT - ATTENDING COMMENTS
Trauma/Acute Care Surgery Attending Note Attestation  Patient was seen and examined bedside.  I reviewed the resident/PA note and agreed with above assessment and plan with following additions and corrections.    Tolerating clear liquid diet. Pain well-controlled.    T(C): 36.6 (03-30-25 @ 12:42), Max: 36.8 (03-30-25 @ 00:54)  HR: 70 (03-30-25 @ 12:42) (68 - 72)  BP: 127/65 (03-30-25 @ 12:42) (108/73 - 130/74)  RR: 18 (03-30-25 @ 12:42) (18 - 18)  SpO2: 98% (03-30-25 @ 12:42) (95% - 98%)      03-29-25 @ 07:01  -  03-30-25 @ 07:00  --------------------------------------------------------  IN:    IV PiggyBack: 100 mL    Lactated Ringers: 100 mL    Lactated Ringers: 400 mL  Total IN: 600 mL    OUT:    Indwelling Catheter - Urethral (mL): 850 mL  Total OUT: 850 mL    Total NET: -250 mL      03-30-25 @ 07:01  -  03-30-25 @ 16:50  --------------------------------------------------------  IN:  Total IN: 0 mL    OUT:    Indwelling Catheter - Urethral (mL): 300 mL    Voided (mL): 335 mL  Total OUT: 635 mL    Total NET: -635 mL    I independently performed a medically appropriate exam. The exam was notable for abdominal incisional tenderness                          13.3   8.87  )-----------( 251      ( 29 Mar 2025 12:45 )             39.4     03-29    139  |  103  |  13  ----------------------------<  129[H]  4.2   |  22  |  0.8    Ca 8.1[L]; Mg x ; Phos x       PT/INR/PTT - ( 28 Mar 2025 20:37 )   PT: 11.30 sec; INR: 0.96 ratio; PTT 31.8 sec       Assessment/Plan:  74y Female PMH DVT, HTN who admitted with gallstone pancreatitis and cholecystitis  - Gallstone pancreatitis/acute cholecystitis - s/p robotic-assisted laparoscopic cholecystectomy, advance diet to low fat diet, possible discharge today with outpatient clindamycin, continue ceftriaxone/flagyl while inpatient  - HTN - outpatient PCP follow up    Nathan Coffman MD  Trauma/Acute Care Surgery/Surgical Critical Care Attending
Patient has mild abdominal pain today.  LFTs slightly down.  Afebrile.  Awaiting EUS/ERCP today.    PE:  AAO x3  Chest: clear  CV : RRR  Abdomen: mildly tender    ASSESSMENT:  75 y/o female with Acute Calculous Cholecystitis.  Gallstone pancreatitis.  Elevated LFTs.  HTN.    PLAN:  - pain control - use Tylenol prn  - encourage incentive spirometer  - npo, ivf  - iv Rocephin and Flagyl  - GI for possible ERCP  - DVT proph  Patient is on add-on OR schedule today for cholecystectomy.
Patient has mild upper abdominal pain.  Her LFTs trending up - now T.Daniele 3.1  Afebrile.    PE:  AAO x3  Chest: clear  CV : RRR  Abdomen: mildly tender    I personally reviewed and interpreted all images and lab:  CT Abd/pelvis - thickened gallbladder, cholecystitis  Has Lip 1000 on admission    ASSESSMENT:  75 y/o female with Acute Calculous Cholecystitis.  Gallstone pancreatitis.  Elevated LFTs.  HTN.    PLAN:  - npo, ivf  - iv Rocephin and Flagyl  - GI eval for possible ERCP  - DVT proph    Patient is on OR schedule for Robotic Cholecystectomy, Possible Laparoscopic, Possible Open, Possible Intraoperative Cholangiogram.    Informed consent was obtained from the patient for the above procedure after explaining all the risks and benefits including but not limited to infection, bleeding and etc. She understood and agreed. All questions were answered.
Agree with above assessment and plan.  Overall the patient has gallstone pancreatitis status post ERCP with removal of sludge and placement of a PD stent and eventual cholecystectomy.  Overall doing well and clinically improving.  Advance diet as tolerated.  No further workup/intervention during current stay from a GI standpoint.  Outpatient follow-up for PD stent removal within 4 weeks with an ERCP.

## 2025-03-31 LAB
CULTURE RESULTS: SIGNIFICANT CHANGE UP
CULTURE RESULTS: SIGNIFICANT CHANGE UP
SPECIMEN SOURCE: SIGNIFICANT CHANGE UP
SPECIMEN SOURCE: SIGNIFICANT CHANGE UP

## 2025-04-03 LAB — SURGICAL PATHOLOGY STUDY: SIGNIFICANT CHANGE UP

## 2025-04-04 DIAGNOSIS — Z88.0 ALLERGY STATUS TO PENICILLIN: ICD-10-CM

## 2025-04-04 DIAGNOSIS — I10 ESSENTIAL (PRIMARY) HYPERTENSION: ICD-10-CM

## 2025-04-04 DIAGNOSIS — K82.A1 GANGRENE OF GALLBLADDER IN CHOLECYSTITIS: ICD-10-CM

## 2025-04-04 DIAGNOSIS — E66.01 MORBID (SEVERE) OBESITY DUE TO EXCESS CALORIES: ICD-10-CM

## 2025-04-04 DIAGNOSIS — Z86.718 PERSONAL HISTORY OF OTHER VENOUS THROMBOSIS AND EMBOLISM: ICD-10-CM

## 2025-04-04 DIAGNOSIS — Z86.16 PERSONAL HISTORY OF COVID-19: ICD-10-CM

## 2025-04-04 DIAGNOSIS — K76.0 FATTY (CHANGE OF) LIVER, NOT ELSEWHERE CLASSIFIED: ICD-10-CM

## 2025-04-04 DIAGNOSIS — K42.9 UMBILICAL HERNIA WITHOUT OBSTRUCTION OR GANGRENE: ICD-10-CM

## 2025-04-04 DIAGNOSIS — K85.10 BILIARY ACUTE PANCREATITIS WITHOUT NECROSIS OR INFECTION: ICD-10-CM

## 2025-04-04 DIAGNOSIS — K80.00 CALCULUS OF GALLBLADDER WITH ACUTE CHOLECYSTITIS WITHOUT OBSTRUCTION: ICD-10-CM

## 2025-04-05 PROBLEM — I82.409 ACUTE EMBOLISM AND THROMBOSIS OF UNSPECIFIED DEEP VEINS OF UNSPECIFIED LOWER EXTREMITY: Chronic | Status: ACTIVE | Noted: 2025-03-26

## 2025-04-10 ENCOUNTER — APPOINTMENT (OUTPATIENT)
Dept: SURGERY | Facility: CLINIC | Age: 75
End: 2025-04-10

## 2025-04-10 VITALS — HEIGHT: 61 IN | HEART RATE: 114 BPM | OXYGEN SATURATION: 96 %

## 2025-04-10 DIAGNOSIS — M79.661 PAIN IN RIGHT LOWER LEG: ICD-10-CM

## 2025-04-10 PROCEDURE — 99024 POSTOP FOLLOW-UP VISIT: CPT

## 2025-04-10 PROCEDURE — 99213 OFFICE O/P EST LOW 20 MIN: CPT | Mod: 24

## 2025-04-10 RX ORDER — KETOROLAC TROMETHAMINE 10 MG/1
10 TABLET, FILM COATED ORAL 3 TIMES DAILY
Qty: 9 | Refills: 0 | Status: ACTIVE | COMMUNITY
Start: 2025-04-10 | End: 1900-01-01

## 2025-05-01 ENCOUNTER — APPOINTMENT (OUTPATIENT)
Dept: SURGERY | Facility: CLINIC | Age: 75
End: 2025-05-01

## 2025-05-01 VITALS
BODY MASS INDEX: 28.7 KG/M2 | WEIGHT: 152 LBS | DIASTOLIC BLOOD PRESSURE: 74 MMHG | SYSTOLIC BLOOD PRESSURE: 122 MMHG | HEART RATE: 92 BPM | TEMPERATURE: 97 F | OXYGEN SATURATION: 97 % | HEIGHT: 61 IN